# Patient Record
Sex: FEMALE | Race: WHITE | Employment: OTHER | URBAN - METROPOLITAN AREA
[De-identification: names, ages, dates, MRNs, and addresses within clinical notes are randomized per-mention and may not be internally consistent; named-entity substitution may affect disease eponyms.]

---

## 2017-09-15 ENCOUNTER — HOSPITAL ENCOUNTER (OUTPATIENT)
Dept: ULTRASOUND IMAGING | Age: 77
Discharge: HOME | End: 2017-09-15
Attending: DERMATOLOGY
Payer: MEDICARE

## 2017-09-15 DIAGNOSIS — M79.89 LEG SWELLING: ICD-10-CM

## 2017-09-15 PROCEDURE — 93971 EXTREMITY STUDY: CPT

## 2018-04-04 ENCOUNTER — HOSPITAL ENCOUNTER (INPATIENT)
Age: 78
LOS: 1 days | Discharge: HOME | DRG: 603 | End: 2018-04-05
Attending: STUDENT IN AN ORGANIZED HEALTH CARE EDUCATION/TRAINING PROGRAM | Admitting: INTERNAL MEDICINE
Payer: MEDICARE

## 2018-04-04 ENCOUNTER — APPOINTMENT (OUTPATIENT)
Dept: GENERAL RADIOLOGY | Age: 78
DRG: 603 | End: 2018-04-04
Attending: STUDENT IN AN ORGANIZED HEALTH CARE EDUCATION/TRAINING PROGRAM
Payer: MEDICARE

## 2018-04-04 DIAGNOSIS — I77.6 VASCULITIS (HCC): ICD-10-CM

## 2018-04-04 DIAGNOSIS — R60.0 LOWER EXTREMITY EDEMA: Primary | ICD-10-CM

## 2018-04-04 PROBLEM — E87.1 HYPONATREMIA: Status: ACTIVE | Noted: 2018-04-04

## 2018-04-04 PROBLEM — E83.52 HYPERCALCEMIA: Status: ACTIVE | Noted: 2018-04-04

## 2018-04-04 PROBLEM — I63.9 CVA (CEREBRAL VASCULAR ACCIDENT) (HCC): Status: ACTIVE | Noted: 2018-04-04

## 2018-04-04 LAB
ALBUMIN SERPL-MCNC: 3.8 G/DL (ref 3.5–5)
ALBUMIN/GLOB SERPL: 1 {RATIO} (ref 1.1–2.2)
ALP SERPL-CCNC: 141 U/L (ref 45–117)
ALT SERPL-CCNC: 30 U/L (ref 12–78)
ANION GAP SERPL CALC-SCNC: 9 MMOL/L (ref 5–15)
APPEARANCE UR: CLEAR
AST SERPL-CCNC: 26 U/L (ref 15–37)
BACTERIA URNS QL MICRO: NEGATIVE /HPF
BASOPHILS # BLD: 0 K/UL (ref 0–0.1)
BASOPHILS NFR BLD: 0 % (ref 0–1)
BILIRUB SERPL-MCNC: 0.6 MG/DL (ref 0.2–1)
BILIRUB UR QL: NEGATIVE
BUN SERPL-MCNC: 14 MG/DL (ref 6–20)
BUN/CREAT SERPL: 15 (ref 12–20)
CALCIUM SERPL-MCNC: 10.3 MG/DL (ref 8.5–10.1)
CHLORIDE SERPL-SCNC: 96 MMOL/L (ref 97–108)
CO2 SERPL-SCNC: 29 MMOL/L (ref 21–32)
COLOR UR: ABNORMAL
CREAT SERPL-MCNC: 0.95 MG/DL (ref 0.55–1.02)
CRP SERPL-MCNC: 4.5 MG/DL
DIFFERENTIAL METHOD BLD: ABNORMAL
EOSINOPHIL # BLD: 0.1 K/UL (ref 0–0.4)
EOSINOPHIL NFR BLD: 1 % (ref 0–7)
EPITH CASTS URNS QL MICRO: NORMAL /LPF
ERYTHROCYTE [DISTWIDTH] IN BLOOD BY AUTOMATED COUNT: 12.8 % (ref 11.5–14.5)
ERYTHROCYTE [SEDIMENTATION RATE] IN BLOOD: 35 MM/HR (ref 0–30)
GLOBULIN SER CALC-MCNC: 3.9 G/DL (ref 2–4)
GLUCOSE SERPL-MCNC: 134 MG/DL (ref 65–100)
GLUCOSE UR STRIP.AUTO-MCNC: NEGATIVE MG/DL
HCT VFR BLD AUTO: 40.4 % (ref 35–47)
HGB BLD-MCNC: 14 G/DL (ref 11.5–16)
HGB UR QL STRIP: NEGATIVE
KETONES UR QL STRIP.AUTO: NEGATIVE MG/DL
LACTATE SERPL-SCNC: 1.4 MMOL/L (ref 0.4–2)
LEUKOCYTE ESTERASE UR QL STRIP.AUTO: ABNORMAL
LYMPHOCYTES # BLD: 1 K/UL (ref 0.8–3.5)
LYMPHOCYTES NFR BLD: 9 % (ref 12–49)
MCH RBC QN AUTO: 34 PG (ref 26–34)
MCHC RBC AUTO-ENTMCNC: 34.7 G/DL (ref 30–36.5)
MCV RBC AUTO: 98.1 FL (ref 80–99)
MONOCYTES # BLD: 1 K/UL (ref 0–1)
MONOCYTES NFR BLD: 10 % (ref 5–13)
NEUTS SEG # BLD: 8.5 K/UL (ref 1.8–8)
NEUTS SEG NFR BLD: 80 % (ref 32–75)
NITRITE UR QL STRIP.AUTO: NEGATIVE
PH UR STRIP: 7 [PH] (ref 5–8)
PLATELET # BLD AUTO: 260 K/UL (ref 150–400)
PMV BLD AUTO: 8.9 FL (ref 8.9–12.9)
POTASSIUM SERPL-SCNC: 4.4 MMOL/L (ref 3.5–5.1)
PROT SERPL-MCNC: 7.7 G/DL (ref 6.4–8.2)
PROT UR STRIP-MCNC: NEGATIVE MG/DL
RBC # BLD AUTO: 4.12 M/UL (ref 3.8–5.2)
RBC #/AREA URNS HPF: NORMAL /HPF (ref 0–5)
SODIUM SERPL-SCNC: 134 MMOL/L (ref 136–145)
SP GR UR REFRACTOMETRY: 1.01 (ref 1–1.03)
UA: UC IF INDICATED,UAUC: NORMAL
UROBILINOGEN UR QL STRIP.AUTO: 0.2 EU/DL (ref 0.2–1)
WBC # BLD AUTO: 10.6 K/UL (ref 3.6–11)
WBC URNS QL MICRO: NORMAL /HPF (ref 0–4)
XXWBCSUS: 0

## 2018-04-04 PROCEDURE — 99218 HC RM OBSERVATION: CPT

## 2018-04-04 PROCEDURE — 71045 X-RAY EXAM CHEST 1 VIEW: CPT

## 2018-04-04 PROCEDURE — 86140 C-REACTIVE PROTEIN: CPT | Performed by: STUDENT IN AN ORGANIZED HEALTH CARE EDUCATION/TRAINING PROGRAM

## 2018-04-04 PROCEDURE — 85025 COMPLETE CBC W/AUTO DIFF WBC: CPT | Performed by: STUDENT IN AN ORGANIZED HEALTH CARE EDUCATION/TRAINING PROGRAM

## 2018-04-04 PROCEDURE — 36415 COLL VENOUS BLD VENIPUNCTURE: CPT | Performed by: STUDENT IN AN ORGANIZED HEALTH CARE EDUCATION/TRAINING PROGRAM

## 2018-04-04 PROCEDURE — 80053 COMPREHEN METABOLIC PANEL: CPT | Performed by: STUDENT IN AN ORGANIZED HEALTH CARE EDUCATION/TRAINING PROGRAM

## 2018-04-04 PROCEDURE — 85652 RBC SED RATE AUTOMATED: CPT | Performed by: STUDENT IN AN ORGANIZED HEALTH CARE EDUCATION/TRAINING PROGRAM

## 2018-04-04 PROCEDURE — 93005 ELECTROCARDIOGRAM TRACING: CPT

## 2018-04-04 PROCEDURE — 99285 EMERGENCY DEPT VISIT HI MDM: CPT

## 2018-04-04 PROCEDURE — 81001 URINALYSIS AUTO W/SCOPE: CPT | Performed by: STUDENT IN AN ORGANIZED HEALTH CARE EDUCATION/TRAINING PROGRAM

## 2018-04-04 PROCEDURE — 83605 ASSAY OF LACTIC ACID: CPT | Performed by: STUDENT IN AN ORGANIZED HEALTH CARE EDUCATION/TRAINING PROGRAM

## 2018-04-04 PROCEDURE — 87040 BLOOD CULTURE FOR BACTERIA: CPT | Performed by: STUDENT IN AN ORGANIZED HEALTH CARE EDUCATION/TRAINING PROGRAM

## 2018-04-04 RX ORDER — SODIUM CHLORIDE 9 MG/ML
75 INJECTION, SOLUTION INTRAVENOUS CONTINUOUS
Status: DISCONTINUED | OUTPATIENT
Start: 2018-04-05 | End: 2018-04-05

## 2018-04-04 RX ORDER — SODIUM CHLORIDE 0.9 % (FLUSH) 0.9 %
5-10 SYRINGE (ML) INJECTION EVERY 8 HOURS
Status: DISCONTINUED | OUTPATIENT
Start: 2018-04-05 | End: 2018-04-05 | Stop reason: HOSPADM

## 2018-04-04 RX ORDER — ENOXAPARIN SODIUM 100 MG/ML
40 INJECTION SUBCUTANEOUS EVERY 24 HOURS
Status: DISCONTINUED | OUTPATIENT
Start: 2018-04-05 | End: 2018-04-05 | Stop reason: HOSPADM

## 2018-04-04 RX ORDER — SODIUM CHLORIDE 9 MG/ML
5-10 INJECTION INTRAMUSCULAR; INTRAVENOUS; SUBCUTANEOUS AS NEEDED
Status: DISCONTINUED | OUTPATIENT
Start: 2018-04-04 | End: 2018-04-05 | Stop reason: HOSPADM

## 2018-04-04 RX ORDER — SODIUM CHLORIDE 0.9 % (FLUSH) 0.9 %
5-10 SYRINGE (ML) INJECTION AS NEEDED
Status: DISCONTINUED | OUTPATIENT
Start: 2018-04-04 | End: 2018-04-05 | Stop reason: HOSPADM

## 2018-04-04 NOTE — ED TRIAGE NOTES
Triage note:  Pt drove self with c/o redness/ swelling to mikhail lower legs. Pt stated this started 2 days ago.

## 2018-04-04 NOTE — IP AVS SNAPSHOT
Summary of Care Report The Summary of Care report has been created to help improve care coordination. Users with access to [employer] or [address] (Web-based application) may access additional patient information including the Discharge Summary. If you are not currently a [address] user and need more information, please call the number listed below in the [place] [address] section and ask to be connected with Medical Records. Facility Information Name Address Phone [address] [facility] Jose Carlos Becerra [address] [phone] Patient Information Patient Name Sex CRISTIAN Ayala ([id number]) Female 1940 Discharge Information Admitting Provider Service Area [facility] MD Erika / [address] [place] [facility]  [phone] Discharge Provider Discharge Date/Time Discharge Disposition Destination (none) 2018 (Pending) AHR (none) Patient Language Language ENGLISH [13] Hospital Problems as of 2018  Reviewed: 2018 11:48 PM by Jabari Shin MD  
  
  
  
 Class Noted - Resolved Last Modified POA Active Problems HTN (hypertension) (Chronic)  2012 - Present 2018 by Jabari Shin MD Yes Entered by Anabel Lizama MD  
  Overview Signed 2014  9:27 AM by Gregory Stevenson MD  
   A. Echo (12):  EF 65%, pseudo. Mildly dil LA. Mild MR/AR. PASP 40. Hyperlipidemia (Chronic)  2012 - Present 2018 by Jabari Shin MD Yes Entered by Anabel Lizama MD  
  CVA (cerebral vascular accident) [facility])  2018 - Present 2018 by Jabari Shin MD Unknown Entered by Jabari Shin MD  
  Hyponatremia  2018 - Present 2018 by Jabari Shin MD Unknown   Entered by Jabari Shin MD  
 Hypercalcemia  4/4/2018 - Present 4/4/2018 by Gilford Saunders, MD Unknown Entered by Gilford Saunders, MD  
  Cellulitis  4/5/2018 - Present 4/5/2018 by Gilford Saunders, MD Unknown Entered by Gilford Saunders, MD  
  Psoriasis  4/5/2018 - Present 4/5/2018 by Gilford Saunders, MD Unknown Entered by Gilford Saunders, MD  
  Leg swelling  4/5/2018 - Present 4/5/2018 by Gilford Saunders, MD Unknown Entered by Gilford Saunders, MD  
  PVD (peripheral vascular disease) ([facility]  4/5/2018 - Present 4/5/2018 by Gilford Saunders, MD Unknown Entered by Gilford Saunders, MD  
  Bilateral leg edema  4/5/2018 - Present 4/5/2018 by Gilford Saunders, MD Unknown Entered by Gilford Saunders, MD  
  Vasculitis ([facility]  4/5/2018 - Present 4/5/2018 by Kahlil Sherwood MD Unknown Entered by Kahlil Sherwood MD  
  
Non-Hospital Problems as of 4/5/2018  Reviewed: 4/4/2018 11:48 PM by Gilford Saunders, MD  
  
  
  
 Class Noted - Resolved Last Modified Active Problems CVA (cerebral infarction)  5/29/2012 - Present 5/31/2012 Entered by Alvaro North MD  
  
You are allergic to the following Allergen Reactions Codeine Other (comments) Hallucinations and dry heaves. Current Discharge Medication List  
  
START taking these medications Dose & Instructions Dispensing Information Comments  
 amoxicillin-clavulanate 875-125 mg per tablet Commonly known as:  AUGMENTIN Dose:  1 Tab Take 1 Tab by mouth every twelve (12) hours for 7 days. Quantity:  14 Tab Refills:  0  
   
 doxycycline 100 mg tablet Commonly known as:  ADOXA Dose:  100 mg Take 1 Tab by mouth two (2) times a day for 7 days. Quantity:  14 Tab Refills:  0  
   
 l.acidoph-B.lactis-B.longum 460 mg (7.5-6- 1.5 bill. cell) Cap cap Commonly known as:  [id number] Dose:  1 Cap Take 1 Cap by mouth Daily (before breakfast). Quantity:  7 Cap Refills:  0 CONTINUE these medications which have NOT CHANGED Dose & Instructions Dispensing Information Comments  
 aspirin 81 mg chewable tablet Dose:  81 mg Take 81 mg by mouth daily. Refills:  0  
   
 atorvastatin 10 mg tablet Commonly known as:  LIPITOR Dose:  10 mg Take 1 Tab by mouth daily. Quantity:  30 Tab Refills:  0  
   
 calcium-vitamin D 500 mg(1,250mg) -200 unit per tablet Commonly known as:  OYSTER SHELL Dose:  1 Tab Take 1 Tab by mouth two (2) times daily (with meals). Refills:  0  
   
 cloNIDine HCl 0.1 mg tablet Commonly known as:  CATAPRES Dose:  0.1 mg Take 0.1 mg by mouth two (2) times a day. Refills:  0  
   
 DIOVAN 80 mg tablet Generic drug:  valsartan Dose:  80 mg Take 80 mg by mouth daily. Refills:  0  
   
 FISH OIL 1,000 mg Cap Generic drug:  omega-3 fatty acids-vitamin e Dose:  1 Cap Take 1 Cap by mouth. Refills:  0  
   
 ustekinumab 45 mg/0.5 mL Soln Generic drug:  Ustekinumab Dose:  45 mg  
45 mg by SubCUTAneous route. Refills:  0  
   
 VITAMIN D3 1,000 unit Cap Generic drug:  cholecalciferol Take  by mouth. Refills:  0 Current Immunizations Name Date Tdap 2014 Follow-up Information Follow up With Details Comments Contact Info Desirae Alston MD In 1 week  [place] [address] 
[phone] 
  
 follow up with your dermatologist     
 Shea Kate MD  As needed [address] [place] [address] RESIDENTIAL [facility]MENT FACILITY [address] [place] [address] 
[phone] Discharge Instructions HOSPITALIST DISCHARGE INSTRUCTIONS 
NAME: Margot Blanco :  1940 MRN:  [id number] Date/Time:  2018 12:59 PM 
 
ADMIT DATE: 2018 DISCHARGE DATE: 2018 PRINCIPAL DISCHARGE DIAGNOSES: 
Leg swelling MEDICATIONS: 
· It is important that you take the medication exactly as they are prescribed. Note the changes and additions to your medications.  Be sure you understand these changes before you are discharged today. · Keep your medication in the bottles provided by the pharmacist and keep a list of the medication names, dosages, and times to be taken in your wallet. · Do not take other medications without consulting your doctor. Pain Management: per above medications What to do at [facility] Recommended diet:  Cardiac Diet Recommended activity: Activity as tolerated If you experience any of the following symptoms then please call your primary care physician or return to the emergency room if you cannot get hold of your doctor: 
Fever, chills, worsening leg swelling/redness/pain, or other severe concerning symptoms that brought you to the hospital in the first place Follow Up: Follow-up Information Follow up With Details Comments Contact Info James Britton MD In 1 week  [place] [address] [phone] 
  
 follow up with your dermatologist     
 Adriano Mahan MD  As needed [address] [place] [address] TREATMENT FACILITY [address] [place] [address] 
[phone] Information obtained by : 
I understand that if any problems occur once I am at home I am to contact my physician. I understand and acknowledge receipt of the instructions indicated above. Physician's or R.N.'s Signature                                                                  Date/Time Patient or Representative Signature                                                          Date/Time Chart Review Routing History Recipient Method Report Sent By Jaspreet Britton MD  
Fax: [phone] Phone: [phone] Fax Gordan Gosselin MD NOTES AUTO ROUTING REPORT Alfonso Diallo MD [[id number]] 4/5/2018 12:56 AM 04/05/2018

## 2018-04-04 NOTE — IP AVS SNAPSHOT
[address] [facility] [address] 
 
 
 [address] [facility] [address] [facility] 
[phone] Patient: Chrissy Steel MRN: [id number] LCT:1/68/8875 About your hospitalization You were admitted on:  April 4, 2018 You last received care in the:  [facility] 5M1 MED SURG 1 You were discharged on:  April 5, 2018 Why you were hospitalized Your primary diagnosis was:  Not on File Your diagnoses also included:  Hyperlipidemia, Htn (Hypertension), Cva (Cerebral Vascular Accident) (Hcc), Hyponatremia, Hypercalcemia, Cellulitis, Psoriasis, Leg Swelling, Pvd (Peripheral Vascular Disease) (Hcc), Bilateral Leg Edema, Vasculitis (H[facility]) Follow-up Information Follow up With Details Comments Contact Info Raymon Fitzpatrick MD In 1 week  [place] [address] [phone] 
  
 follow up with your dermatologist     
 Sigifredo Haney MD  As needed [address] [facility] RESIDENTIAL TREATMENT FACILITY [address] [facility] 
[phone] Discharge Orders None A check codi indicates which time of day the medication should be taken. My Medications START taking these medications Instructions Each Dose to Equal  
 Morning Noon Evening Bedtime  
 amoxicillin-clavulanate 875-125 mg per tablet Commonly known as:  AUGMENTIN Take 1 Tab by mouth every twelve (12) hours for 7 days. 1 Tab  
    
   
   
   
  
 doxycycline 100 mg tablet Commonly known as:  ADOXA Take 1 Tab by mouth two (2) times a day for 7 days. 100 mg  
    
   
   
   
  
 l.acidoph-B.lactis-B.longum 460 mg (7.5-6- 1.5 bill. cell) Cap cap Commonly known as:  PULPBGOX4 Take 1 Cap by mouth Daily (before breakfast). 1 Cap CONTINUE taking these medications Instructions Each Dose to Equal  
 Morning Noon Evening Bedtime  
 aspirin 81 mg chewable tablet Your last dose was:  81 mg on 4/5/2018  9:19 AM  
   
 Take 81 mg by mouth daily. 81 mg  
    
   
   
   
  
 atorvastatin 10 mg tablet Commonly known as:  LIPITOR Your last dose was:  10 mg on 2018  9:19 AM  
   
 Take 1 Tab by mouth daily. 10 mg  
    
   
   
   
  
 calcium-vitamin D 500 mg(1,250mg) -200 unit per tablet Commonly known as:  OYSTER SHELL Take 1 Tab by mouth two (2) times daily (with meals). 1 Tab  
    
   
   
   
  
 cloNIDine HCl 0.1 mg tablet Commonly known as:  CATAPRES Take 0.1 mg by mouth two (2) times a day. 0.1 mg  
    
   
   
   
  
 DIOVAN 80 mg tablet Generic drug:  valsartan Take 80 mg by mouth daily. 80 mg FISH OIL 1,000 mg Cap Generic drug:  omega-3 fatty acids-vitamin e Take 1 Cap by mouth. 1 Cap  
    
   
   
   
  
 ustekinumab 45 mg/0.5 mL Soln Generic drug:  Ustekinumab 45 mg by SubCUTAneous route. 45 mg  
    
   
   
   
  
 VITAMIN D3 1,000 unit Cap Generic drug:  cholecalciferol Take  by mouth. Where to Get Your Medications Information on where to get these meds will be given to you by the nurse or doctor. ! Ask your nurse or doctor about these medications  
  amoxicillin-clavulanate 875-125 mg per tablet  
 doxycycline 100 mg tablet  
 l.acidoph-B.lactis-B.longum 460 mg (7.5-6- 1.5 bill. cell) Cap cap Discharge Instructions HOSPITALIST DISCHARGE INSTRUCTIONS 
NAME: Adair Gay :  1940 MRN:  [id number] Date/Time:  2018 12:59 PM 
 
ADMIT DATE: 2018 DISCHARGE DATE: 2018 PRINCIPAL DISCHARGE DIAGNOSES: 
Leg swelling MEDICATIONS: 
· It is important that you take the medication exactly as they are prescribed. Note the changes and additions to your medications. Be sure you understand these changes before you are discharged today.  
· Keep your medication in the bottles provided by the pharmacist and keep a list of the medication names, dosages, and times to be taken in your wallet. · Do not take other medications without consulting your doctor. Pain Management: per above medications What to do at [facility] Recommended diet:  Cardiac Diet Recommended activity: Activity as tolerated If you experience any of the following symptoms then please call your primary care physician or return to the emergency room if you cannot get hold of your doctor: 
Fever, chills, worsening leg swelling/redness/pain, or other severe concerning symptoms that brought you to the hospital in the first place Follow Up: Follow-up Information Follow up With Details Comments Contact Info Ricki Chase MD In 1 week  [place] [id number] [phone] 
  
 follow up with your dermatologist     
 Meli Pandey MD  As needed [address] [place] [address] TREATMENT FACILITY [address] [place] [address] 
[phone] Information obtained by : 
I understand that if any problems occur once I am at home I am to contact my physician. I understand and acknowledge receipt of the instructions indicated above. Physician's or R.N.'s Signature                                                                  Date/Time Patient or Representative Signature                                                          Date/Time [place]inet Announcement We are excited to announce that we are making your provider's discharge notes available to you in [place]. You will see these notes when they are completed and signed by the physician that discharged you from your recent hospital stay.   If you have any questions or concerns about any information you see in [employer], please call the Health Information Department where you were seen or reach out to your Primary Care Provider for more information about your plan of care. Introducing [facility]! Allen Lugo introduces [employer] patient portal. Now you can access parts of your medical record, email your doctor's office, and request medication refills online. 1. In your internet browser, go to [email or web address] 2. Click on the First Time User? Click Here link in the Sign In box. You will see the New Member Sign Up page. 3. Enter your [employer] Access Code exactly as it appears below. You will not need to use this code after youve completed the sign-up process. If you do not sign up before the expiration date, you must request a new code. · [employer] Access Code: [email or web address] Expires: 7/3/2018  7:37 PM 
 
4. Enter the last four digits of your Social Security Number (xxxx) and Date of Birth (mm/dd/yyyy) as indicated and click Submit. You will be taken to the next sign-up page. 5. Create a [employer] ID. This will be your [employer] login ID and cannot be changed, so think of one that is secure and easy to remember. 6. Create a [employer] password. You can change your password at any time. 7. Enter your Password Reset Question and Answer. This can be used at a later time if you forget your password. 8. Enter your e-mail address. You will receive e-mail notification when new information is available in [address] 9. Click Sign Up. You can now view and download portions of your medical record. 10. Click the Download Summary menu link to download a portable copy of your medical information. If you have questions, please visit the Frequently Asked Questions section of the [employer] website. Remember, [employer] is NOT to be used for urgent needs. For medical emergencies, dial 911. Now available from your iPhone and Android! Introducing Mukesh Burns As a [facility] patient, I wanted to make you aware of our electronic visit tool called Mukesh [employer]. [employer] 24/7 allows you to connect within minutes with a medical provider 24 hours a day, seven days a week via a mobile device or tablet or logging into a secure website from your computer. You can access Mukesh Stokesfin from anywhere in the [place]. A virtual visit might be right for you when you have a simple condition and feel like you just dont want to get out of bed, or cant get away from work for an appointment, when your regular [facility] provider is not available (evenings, weekends or holidays), or when youre out of town and need minor care. Electronic visits cost only $49 and if the [place] [employer] 24/7 provider determines a prescription is needed to treat your condition, one can be electronically transmitted to a nearby pharmacy*. Please take a moment to enroll today if you have not already done so. The enrollment process is free and takes just a few minutes. To enroll, please download the Fiona Guzman 24/7 krysta to your tablet or phone, or visit [email or web address] to enroll on your computer. And, as an [address] [place] [address] patient with a [employer] account, the results of your visits will be scanned into your electronic medical record and your primary care provider will be able to view the scanned results. We urge you to continue to see your regular [facility] provider for your ongoing medical care. And while your primary care provider may not be the one available when you seek a Mukesh Burrismeloniefin virtual visit, the peace of mind you get from getting a real diagnosis real time can be priceless. For more information on Mukesh Daytonmeloniefin, view our Frequently Asked Questions (FAQs) at [email or web address]. Sincerely, 
 
Libby Diaz MD 
Chief Medical Officer Jack8 Jennifer Ellington *:  certain medications cannot be prescribed via Mukesh Burns Unresulted Labs-Please follow up with your PCP about these lab tests Order Current Status CULTURE, BLOOD In process CULTURE, MRSA In process ANKLE BRACHIAL INDEX Preliminary result Providers Seen During Your Hospitalization Provider Specialty Primary office phone Malou Christie MD Emergency Medicine [phone] Greg Logan MD Hospitalist [phone] Mauro Marie MD Internal Medicine [phone] Your Primary Care Physician (PCP) Primary Care Physician Office Phone Office Fax Sachin Celeste [phone] You are allergic to the following Allergen Reactions Codeine Other (comments) Hallucinations and dry heaves. Recent Documentation Height Weight BMI OB Status Smoking Status 1.626 m 61.3 kg 23.2 kg/m2 Postmenopausal Never Smoker Emergency Contacts Name Discharge Info Relation Home Work Mobile [address] CAREGIVER [3] Spouse [3] [phone] Patient Belongings The following personal items are in your possession at time of discharge: 
     Visual Aid: Glasses, At bedside          Jewelry: Ring, With patient, Bracelet  Clothing: Pants, Shirt, Undergarments, With patient Please provide this summary of care documentation to your next provider. Signatures-by signing, you are acknowledging that this After Visit Summary has been reviewed with you and you have received a copy. Patient Signature:  ____________________________________________________________ Date:  ____________________________________________________________  
  
Gaby Hoskins Provider Signature:  ____________________________________________________________ Date:  ____________________________________________________________

## 2018-04-05 VITALS
SYSTOLIC BLOOD PRESSURE: 165 MMHG | BODY MASS INDEX: 23.07 KG/M2 | WEIGHT: 135.14 LBS | HEART RATE: 73 BPM | OXYGEN SATURATION: 99 % | DIASTOLIC BLOOD PRESSURE: 81 MMHG | HEIGHT: 64 IN | TEMPERATURE: 97.8 F | RESPIRATION RATE: 18 BRPM

## 2018-04-05 PROBLEM — L40.9 PSORIASIS: Status: ACTIVE | Noted: 2018-04-05

## 2018-04-05 PROBLEM — M79.89 LEG SWELLING: Status: ACTIVE | Noted: 2018-04-05

## 2018-04-05 PROBLEM — I77.6 VASCULITIS (HCC): Status: RESOLVED | Noted: 2018-04-04 | Resolved: 2018-04-05

## 2018-04-05 PROBLEM — L03.90 CELLULITIS: Status: ACTIVE | Noted: 2018-04-05

## 2018-04-05 PROBLEM — I73.9 PVD (PERIPHERAL VASCULAR DISEASE) (HCC): Status: ACTIVE | Noted: 2018-04-05

## 2018-04-05 PROBLEM — I77.6 VASCULITIS (HCC): Status: ACTIVE | Noted: 2018-04-05

## 2018-04-05 PROBLEM — R60.0 BILATERAL LEG EDEMA: Status: ACTIVE | Noted: 2018-04-05

## 2018-04-05 LAB
ANION GAP SERPL CALC-SCNC: 7 MMOL/L (ref 5–15)
BUN SERPL-MCNC: 13 MG/DL (ref 6–20)
BUN/CREAT SERPL: 15 (ref 12–20)
CALCIUM SERPL-MCNC: 9.4 MG/DL (ref 8.5–10.1)
CHLORIDE SERPL-SCNC: 99 MMOL/L (ref 97–108)
CO2 SERPL-SCNC: 26 MMOL/L (ref 21–32)
CREAT SERPL-MCNC: 0.88 MG/DL (ref 0.55–1.02)
ERYTHROCYTE [DISTWIDTH] IN BLOOD BY AUTOMATED COUNT: 12.7 % (ref 11.5–14.5)
GLUCOSE SERPL-MCNC: 141 MG/DL (ref 65–100)
HCT VFR BLD AUTO: 36 % (ref 35–47)
HGB BLD-MCNC: 12.3 G/DL (ref 11.5–16)
LACTATE SERPL-SCNC: 2 MMOL/L (ref 0.4–2)
MCH RBC QN AUTO: 33.4 PG (ref 26–34)
MCHC RBC AUTO-ENTMCNC: 34.2 G/DL (ref 30–36.5)
MCV RBC AUTO: 97.8 FL (ref 80–99)
NRBC # BLD: 0 K/UL (ref 0–0.01)
NRBC BLD-RTO: 0 PER 100 WBC
PLATELET # BLD AUTO: 228 K/UL (ref 150–400)
PMV BLD AUTO: 8.6 FL (ref 8.9–12.9)
POTASSIUM SERPL-SCNC: 4 MMOL/L (ref 3.5–5.1)
RBC # BLD AUTO: 3.68 M/UL (ref 3.8–5.2)
SODIUM SERPL-SCNC: 132 MMOL/L (ref 136–145)
WBC # BLD AUTO: 7.2 K/UL (ref 3.6–11)

## 2018-04-05 PROCEDURE — 74011250637 HC RX REV CODE- 250/637: Performed by: INTERNAL MEDICINE

## 2018-04-05 PROCEDURE — 83605 ASSAY OF LACTIC ACID: CPT | Performed by: STUDENT IN AN ORGANIZED HEALTH CARE EDUCATION/TRAINING PROGRAM

## 2018-04-05 PROCEDURE — 93970 EXTREMITY STUDY: CPT

## 2018-04-05 PROCEDURE — 99218 HC RM OBSERVATION: CPT

## 2018-04-05 PROCEDURE — 74011000258 HC RX REV CODE- 258: Performed by: INTERNAL MEDICINE

## 2018-04-05 PROCEDURE — 93306 TTE W/DOPPLER COMPLETE: CPT

## 2018-04-05 PROCEDURE — 93922 UPR/L XTREMITY ART 2 LEVELS: CPT

## 2018-04-05 PROCEDURE — 65270000029 HC RM PRIVATE

## 2018-04-05 PROCEDURE — 85027 COMPLETE CBC AUTOMATED: CPT | Performed by: INTERNAL MEDICINE

## 2018-04-05 PROCEDURE — 36415 COLL VENOUS BLD VENIPUNCTURE: CPT | Performed by: INTERNAL MEDICINE

## 2018-04-05 PROCEDURE — 80048 BASIC METABOLIC PNL TOTAL CA: CPT | Performed by: INTERNAL MEDICINE

## 2018-04-05 PROCEDURE — 74011250636 HC RX REV CODE- 250/636: Performed by: INTERNAL MEDICINE

## 2018-04-05 RX ORDER — DOXYCYCLINE 100 MG/1
100 TABLET ORAL 2 TIMES DAILY
Qty: 14 TAB | Refills: 0 | Status: SHIPPED | OUTPATIENT
Start: 2018-04-05 | End: 2018-04-12

## 2018-04-05 RX ORDER — ACETAMINOPHEN 325 MG/1
650 TABLET ORAL
Status: DISCONTINUED | OUTPATIENT
Start: 2018-04-05 | End: 2018-04-05 | Stop reason: HOSPADM

## 2018-04-05 RX ORDER — DOXYCYCLINE 100 MG/1
100 TABLET ORAL 2 TIMES DAILY
Qty: 14 TAB | Refills: 0 | Status: SHIPPED | OUTPATIENT
Start: 2018-04-05 | End: 2018-04-05

## 2018-04-05 RX ORDER — GUAIFENESIN 100 MG/5ML
81 LIQUID (ML) ORAL DAILY
Status: DISCONTINUED | OUTPATIENT
Start: 2018-04-05 | End: 2018-04-05 | Stop reason: HOSPADM

## 2018-04-05 RX ORDER — AMOXICILLIN AND CLAVULANATE POTASSIUM 875; 125 MG/1; MG/1
1 TABLET, FILM COATED ORAL EVERY 12 HOURS
Qty: 14 TAB | Refills: 0 | Status: SHIPPED | OUTPATIENT
Start: 2018-04-05 | End: 2018-04-05

## 2018-04-05 RX ORDER — HYDRALAZINE HYDROCHLORIDE 20 MG/ML
10 INJECTION INTRAMUSCULAR; INTRAVENOUS
Status: DISCONTINUED | OUTPATIENT
Start: 2018-04-05 | End: 2018-04-05 | Stop reason: HOSPADM

## 2018-04-05 RX ORDER — VALSARTAN 80 MG/1
80 TABLET ORAL DAILY
Status: DISCONTINUED | OUTPATIENT
Start: 2018-04-05 | End: 2018-04-05 | Stop reason: HOSPADM

## 2018-04-05 RX ORDER — CLONIDINE HYDROCHLORIDE 0.1 MG/1
0.1 TABLET ORAL 2 TIMES DAILY
Status: DISCONTINUED | OUTPATIENT
Start: 2018-04-05 | End: 2018-04-05 | Stop reason: HOSPADM

## 2018-04-05 RX ORDER — AMOXICILLIN AND CLAVULANATE POTASSIUM 875; 125 MG/1; MG/1
1 TABLET, FILM COATED ORAL EVERY 12 HOURS
Qty: 14 TAB | Refills: 0 | Status: SHIPPED | OUTPATIENT
Start: 2018-04-05 | End: 2018-04-12

## 2018-04-05 RX ORDER — ATORVASTATIN CALCIUM 10 MG/1
10 TABLET, FILM COATED ORAL DAILY
Status: DISCONTINUED | OUTPATIENT
Start: 2018-04-05 | End: 2018-04-05 | Stop reason: HOSPADM

## 2018-04-05 RX ADMIN — Medication 10 ML: at 00:51

## 2018-04-05 RX ADMIN — ACETAMINOPHEN 650 MG: 325 TABLET ORAL at 07:03

## 2018-04-05 RX ADMIN — SODIUM CHLORIDE 75 ML/HR: 900 INJECTION, SOLUTION INTRAVENOUS at 00:12

## 2018-04-05 RX ADMIN — Medication 10 ML: at 14:40

## 2018-04-05 RX ADMIN — CLONIDINE HYDROCHLORIDE 0.1 MG: 0.1 TABLET ORAL at 06:43

## 2018-04-05 RX ADMIN — ACETAMINOPHEN 650 MG: 325 TABLET ORAL at 14:39

## 2018-04-05 RX ADMIN — ATORVASTATIN CALCIUM 10 MG: 10 TABLET, FILM COATED ORAL at 09:19

## 2018-04-05 RX ADMIN — ASPIRIN 81 MG 81 MG: 81 TABLET ORAL at 09:19

## 2018-04-05 RX ADMIN — Medication 10 ML: at 06:44

## 2018-04-05 RX ADMIN — CEFAZOLIN SODIUM 1 G: 1 INJECTION, POWDER, FOR SOLUTION INTRAMUSCULAR; INTRAVENOUS at 00:51

## 2018-04-05 RX ADMIN — CEFAZOLIN SODIUM 1 G: 1 INJECTION, POWDER, FOR SOLUTION INTRAMUSCULAR; INTRAVENOUS at 09:19

## 2018-04-05 NOTE — ED NOTES
AIDET communication provided and informed of purposeful rounding to include collaboration of entire care team; patient acknowledged understanding. Pillow and blanket given for comfort.  at bedside.

## 2018-04-05 NOTE — PROGRESS NOTES
6326  Primary Nurse Gertrude Weeks, SOURAV and Darryl Ruiz RN performed a dual skin assessment on this patient Impairment noted: scattered psoriasis, BLE red and juan, ecchymosis noted to L upper arm   Ady score is 22      2345  TRANSFER - IN REPORT:    Verbal report received from Vandana Beckwith RN (name) on [facility]  being received from  [facility] ED(unit) for routine progression of care      Report consisted of patients Situation, Background, Assessment and   Recommendations(SBAR). Information from the following report(s) SBAR, Kardex, Intake/Output, MAR and Recent Results was reviewed with the receiving nurse. Opportunity for questions and clarification was provided. Assessment completed upon patients arrival to unit and care assumed.

## 2018-04-05 NOTE — PHYSICIAN ADVISORY
Short Stay Review       Pt Name:  Juma Martin   MR#  [id number]   CSN#   [id number]   [address] [place] [address]  577/50  @ [address] [address] [facility]   Hospitalization date  4/4/2018  7:34 PM  No discharge date for patient encounter. Current Attending Physician  Snow Stein MD     A discharge order has been placed for this episode of hospital care for Ms. Juma Martin; since this hospital stay is less than two midnights, I reviewed Ms. Michelle Cervantes chart. Ms. Michelle Cervantes healthcare insurance/benefit include:  Payor: Laura Becerril / Plan: [facility] / Product Type: Medicare /     Utilization Review related clinical summary:   Please refer to my earlier note.        On the basis of chart review, this patient's hospitalization status      is appropriate for Stefania Dominguez MD MPH FACP   Physician Advisor    [address]   Utilization Review, Care Management         CSN:  [id number]   GUNJAN:   [id number]  Admitted on :  4/4/2018   Discharge order

## 2018-04-05 NOTE — DISCHARGE SUMMARY
Physician Discharge Summary     Patient ID:  Dina Loja  [id number]  69 y.o.  1940    Admit date: 4/4/2018    Discharge date: 4/5/2018    Admission Diagnoses: Vasculitis ([facility]  Vasculitis ([facility] [facility]    Principal Discharge Diagnoses:    cellulitis    OTHER PROBLEMS ADDRESSEDS  Principal Diagnosis <principal problem not specified>                                            Active Problems:    HTN (hypertension) (5/29/2012)      Overview: A.  Echo (5/30/12):  EF 65%, pseudo. Mildly dil LA. Mild MR/AR. PASP       40. Hyperlipidemia (5/29/2012)      CVA (cerebral vascular accident) ([facility] (4/4/2018)      Hyponatremia (4/4/2018)      Hypercalcemia (4/4/2018)      Cellulitis (4/5/2018)      Psoriasis (4/5/2018)      Leg swelling (4/5/2018)      PVD (peripheral vascular disease) ([facility] (4/5/2018)      Bilateral leg edema (4/5/2018)      Vasculitis ([facility] [facility] (4/5/2018)       Patient Active Problem List   Diagnosis Code    CVA (cerebral infarction) I63.9    HTN (hypertension) I10    Hyperlipidemia E78.5    CVA (cerebral vascular accident) ([facility] I63.9    Hyponatremia E87.1    Hypercalcemia E83.52    Cellulitis L03.90    Psoriasis L40.9    Leg swelling M79.89    PVD (peripheral vascular disease) ([facility]) I73.9    Bilateral leg edema R60.0    Vasculitis ([facility]) I77.6         Hospital Course:          Bilateral leg edema (4/5/2018)/ hyperemia: recently started on Stelara. With redness, swelling in the setting of extensive psoriasis. May have underlying cellulitis. Non-toxic appearing. 0/4 SIRS. Given IV abx in hospital, will continue antibiotics PO outpatient (doxy and Augmentin). LE dopplers negative. MONSTER results as above, borderline PAD in the legs, and mild to moderate disease in the digits. Instructed to follow up with vascular surgery. TTE showed preserved EF, no valvular disease      Hyponatremia (4/4/2018), mild, chronic. Follow outpatient      HTN (hypertension) (5/29/2012).  BP fluctuates, continue home clonidine and valsartan        Hyperlipidemia (5/29/2012). On statin       CVA (cerebral vascular accident) ([facility] [address] [facility] (4/4/2018). Continue ASA and statin       Hypercalcemia (4/4/2018), mild resolved       Psoriasis (4/5/2018), severe. Follow up with dermatologist       Pt discharged in improved and stable condition. Procedures performed: see above  MONSTER  1. Borderline arterial disease indicated at rest in the right leg. 2.Borderline arterial disease indicated at rest in the left leg. 3. The right ankle/brachial index is 0.93 and the left ankle/brachial  index is 0.80. 4. The right toe/brachial index is 0.52 and the left toe/brachial  index is 0.51,both indicative of mild to moderate vascular disease in  the digits. Imaging studies: see above        PCP: Eliana Barba MD    Consults: None    Discharge Exam:  Patient Vitals for the past 12 hrs:   Temp Pulse Resp BP SpO2   04/05/18 1226 97.8 °F (36.6 °C) 73 18 165/81 99 %   04/05/18 0927 97.8 °F (36.6 °C) 79 18 117/67 97 %     GEN: pleasant, NAD  CV: RRR  RESP: CTAB  SKIN: 2+BLE edema, erythematous plaques, no open wounds    Disposition: home    Patient Instructions:   Current Discharge Medication List      START taking these medications    Details   doxycycline (ADOXA) 100 mg tablet Take 1 Tab by mouth two (2) times a day for 7 days. Qty: 14 Tab, Refills: 0      amoxicillin-clavulanate (AUGMENTIN) 875-125 mg per tablet Take 1 Tab by mouth every twelve (12) hours for 7 days. Qty: 14 Tab, Refills: 0      l.acidoph-B.lactis-B.longum (FLORAJEN3) 460 mg (7.5-6- 1.5 bill. cell) cap cap Take 1 Cap by mouth Daily (before breakfast). Qty: 7 Cap, Refills: 0         CONTINUE these medications which have NOT CHANGED    Details   Ustekinumab (USTEKINUMAB) 45 mg/0.5 mL soln 45 mg by SubCUTAneous route. Cholecalciferol, Vitamin D3, (VITAMIN D3) 1,000 unit cap Take  by mouth. aspirin 81 mg chewable tablet Take 81 mg by mouth daily.       atorvastatin (LIPITOR) 10 mg tablet Take 1 Tab by mouth daily. Qty: 30 Tab, Refills: 0      cloNIDine (CATAPRES) 0.1 mg tablet Take 0.1 mg by mouth two (2) times a day. valsartan (DIOVAN) 80 mg tablet Take 80 mg by mouth daily. omega-3 fatty acids-vitamin e (FISH OIL) 1,000 mg Cap Take 1 Cap by mouth.        calcium-vitamin D (OYSTER SHELL) 500 mg(1,250mg) -200 unit per tablet Take 1 Tab by mouth two (2) times daily (with meals). Activity: See discharge instructions  Diet: See discharge instructions  Wound Care: See discharge instructions    Follow-up Information     Follow up With Details Comments Jason Serrano MD In 1 week  [place] [address]  [phone]      follow up with your dermatologist       Ramana Bustos MD  As needed [facility] [address]  [phone]            I spent 35 minutes on this discharge.     Signed:  Siri Wick MD  4/5/2018  1:02 PM

## 2018-04-05 NOTE — ED PROVIDER NOTES
HPI Comments: Patient is a 71-year-old female presenting to the emergency department for bilateral lower extremity edema, swelling. Patient states that redness and swelling started approximately 2 days ago patient feels that this is all related to her psoriasis medication that she was started on a few weeks ago Stelara (ustekinumab). Patient denies any fevers, chills, body aches is complaining of lower extremity swelling tenderness to palpation. She's also been noticing some oozing from the bilateral lower extremities. Patient's been on multiple immunotherapies for her psoriasis recently started on stelara on 3/4. She denies any chest pain, shortness of breath, abdominal pain, nausea/vomiting, diarrhea. Patient is a 69 y.o. female presenting with skin infection. The history is provided by the patient. Skin Infection   This is a new problem. The current episode started 2 days ago. The problem has been gradually worsening. Pertinent negatives include no chest pain, no abdominal pain, no headaches and no shortness of breath. Past Medical History:   Diagnosis Date    H/O psoriasis     Hyperlipidemia     Hypertension     Stroke ([facility] 2012    TIA       Past Surgical History:   Procedure Laterality Date    HX CATARACT REMOVAL      HX HYSTERECTOMY      OH NASAL SURG PROC UNLISTED           Family History:   Problem Relation Age of Onset    Other Mother       from surgical complications    Hypertension Mother     Hypertension Father     Stroke Father        Social History     Social History    Marital status:      Spouse name: N/A    Number of children: N/A    Years of education: N/A     Occupational History    Not on file.      Social History Main Topics    Smoking status: Never Smoker    Smokeless tobacco: Never Used    Alcohol use 3.5 oz/week     7 Standard drinks or equivalent per week      Comment: red wine, one glass a night    Drug use: Not on file    Sexual activity: Not on file     Other Topics Concern    Not on file     Social History Narrative         ALLERGIES: Codeine    Review of Systems   Constitutional: Negative for activity change, diaphoresis, fatigue and fever. HENT: Negative for congestion and sore throat. Eyes: Negative for photophobia and visual disturbance. Respiratory: Negative for chest tightness and shortness of breath. Cardiovascular: Negative for chest pain, palpitations and leg swelling. Gastrointestinal: Negative for abdominal pain, blood in stool, constipation, diarrhea, nausea and vomiting. Genitourinary: Negative for difficulty urinating, dysuria, flank pain, frequency and hematuria. Musculoskeletal: Negative for back pain. Neurological: Negative for dizziness, syncope, numbness and headaches. Vitals:    04/04/18 1949 04/04/18 2000 04/04/18 2123 04/04/18 2125   BP:  182/89 176/68    Pulse:       Resp:  20 20    Temp:       SpO2: 100% 100% 99% 99%   Weight:       Height:                Physical Exam   Constitutional: She is oriented to person, place, and time. She appears well-developed and well-nourished. No distress. HENT:   Head: Normocephalic and atraumatic. Nose: Nose normal.   Mouth/Throat: Oropharynx is clear and moist. No oropharyngeal exudate. Eyes: Conjunctivae and EOM are normal. Pupils are equal, round, and reactive to light. Right eye exhibits no discharge. Left eye exhibits no discharge. No scleral icterus. Neck: Normal range of motion. Neck supple. No JVD present. No tracheal deviation present. No thyromegaly present. Cardiovascular: Normal rate, regular rhythm, normal heart sounds and intact distal pulses. Exam reveals no gallop and no friction rub. No murmur heard. Left sided DP and PT pulses present and palpable. Right sided DP and PT pulses not palpable but dopplerable. Pulmonary/Chest: Effort normal and breath sounds normal. No stridor. No respiratory distress. She has no wheezes.  She has no rales. She exhibits no tenderness. Abdominal: Soft. Bowel sounds are normal. She exhibits no distension and no mass. There is no tenderness. There is no rebound. Musculoskeletal: Normal range of motion. She exhibits edema. She exhibits no tenderness. Lymphadenopathy:     She has no cervical adenopathy. Neurological: She is alert and oriented to person, place, and time. No cranial nerve deficit. Coordination normal.   Skin: Skin is warm and dry. Rash noted. She is not diaphoretic. There is erythema. No pallor. Bilateral lower extremity edema and redness, non-blanching. Psychiatric: She has a normal mood and affect. Her behavior is normal. Judgment and thought content normal.   Nursing note and vitals reviewed. MDM  Number of Diagnoses or Management Options  Lower extremity edema:   Vasculitis [facility]):   Diagnosis management comments: Cellulitis, vasculitis, medication reaction, medication side effect. Ms. Afua Brown is a 70 y/o female presenting to the emergency department with bilateral lower extremity edema erythema nonblanching redness lower extremities concern for vasculitis. Plan:  CBC, CMP, chest x-ray portable, EKG, ESR, CRP, lactate, blood cultures. Reassessment: Labs are unremarkable except for he is sore in CRP which is more concerning for vasculitis also patient's decreased peripheral pulses in the right lower extremity feel that admission is warranted for further evaluation and treatment. Discussed this with the patient was reluctant to be admitted with significant other states that she will. Reassessment: Spoke to Dr. Wanda Duverney who will admit patient to [address] [place] [address].        Amount and/or Complexity of Data Reviewed  Clinical lab tests: ordered and reviewed  Tests in the radiology section of CPT®: ordered and reviewed  Review and summarize past medical records: yes  Discuss the patient with other providers: yes    Risk of Complications, Morbidity, and/or Mortality  Presenting problems: moderate  Diagnostic procedures: moderate  Management options: moderate    Patient Progress  Patient progress: stable        ED Course       Procedures    10:26 PM  Patient is being admitted to the hospital.  The results of their tests and reasons for their admission have been discussed with them and/or available family. They convey agreement and understanding for the need to be admitted and for their admission diagnosis. Consultation has been made with the inpatient physician specialist for hospitalization. LABORATORY TESTS:  Recent Results (from the past 12 hour(s))   URINALYSIS W/ RFLX MICROSCOPIC    Collection Time: 04/04/18  8:30 PM   Result Value Ref Range    Color STRAW      Appearance CLEAR CLEAR      Specific gravity 1.010 1.003 - 1.030      pH (UA) 7.0 5.0 - 8.0      Protein NEGATIVE  NEG mg/dL    Glucose NEGATIVE  NEG mg/dL    Ketone NEGATIVE  NEG mg/dL    Bilirubin NEGATIVE  NEG      Blood NEGATIVE  NEG      Urobilinogen 0.2 0.2 - 1.0 EU/dL    Nitrites NEGATIVE  NEG      Leukocyte Esterase TRACE (A) NEG     LACTIC ACID    Collection Time: 04/04/18  8:58 PM   Result Value Ref Range    Lactic acid 1.4 0.4 - 2.0 MMOL/L   METABOLIC PANEL, COMPREHENSIVE    Collection Time: 04/04/18  8:58 PM   Result Value Ref Range    Sodium 134 (L) 136 - 145 mmol/L    Potassium 4.4 3.5 - 5.1 mmol/L    Chloride 96 (L) 97 - 108 mmol/L    CO2 29 21 - 32 mmol/L    Anion gap 9 5 - 15 mmol/L    Glucose 134 (H) 65 - 100 mg/dL    BUN 14 6 - 20 MG/DL    Creatinine 0.95 0.55 - 1.02 MG/DL    BUN/Creatinine ratio 15 12 - 20      GFR est AA >60 >60 ml/min/1.73m2    GFR est non-AA 57 (L) >60 ml/min/1.73m2    Calcium 10.3 (H) 8.5 - 10.1 MG/DL    Bilirubin, total 0.6 0.2 - 1.0 MG/DL    ALT (SGPT) 30 12 - 78 U/L    AST (SGOT) 26 15 - 37 U/L    Alk.  phosphatase 141 (H) 45 - 117 U/L    Protein, total 7.7 6.4 - 8.2 g/dL    Albumin 3.8 3.5 - 5.0 g/dL    Globulin 3.9 2.0 - 4.0 g/dL    A-G Ratio 1.0 (L) 1.1 - 2.2     CBC WITH AUTOMATED DIFF    Collection Time: 04/04/18  8:58 PM   Result Value Ref Range    WBC 10.6 3.6 - 11.0 K/uL    RBC 4.12 3.80 - 5.20 M/uL    HGB 14.0 11.5 - 16.0 g/dL    HCT 40.4 35.0 - 47.0 %    MCV 98.1 80.0 - 99.0 FL    MCH 34.0 26.0 - 34.0 PG    MCHC 34.7 30.0 - 36.5 g/dL    RDW 12.8 11.5 - 14.5 %    PLATELET 017 284 - 777 K/uL    MPV 8.9 8.9 - 12.9 FL    NEUTROPHILS 80 (H) 32 - 75 %    LYMPHOCYTES 9 (L) 12 - 49 %    MONOCYTES 10 5 - 13 %    EOSINOPHILS 1 0 - 7 %    BASOPHILS 0 0 - 1 %    ABS. NEUTROPHILS 8.5 (H) 1.8 - 8.0 K/UL    ABS. LYMPHOCYTES 1.0 0.8 - 3.5 K/UL    ABS. MONOCYTES 1.0 0.0 - 1.0 K/UL    ABS. EOSINOPHILS 0.1 0.0 - 0.4 K/UL    ABS. BASOPHILS 0.0 0.0 - 0.1 K/UL    DF AUTOMATED      [id number] 0     SED RATE (ESR)    Collection Time: 04/04/18  8:58 PM   Result Value Ref Range    Sed rate, automated 35 (H) 0 - 30 mm/hr   C REACTIVE PROTEIN, QT    Collection Time: 04/04/18  8:58 PM   Result Value Ref Range    C-Reactive protein 4.50 (H) <0.60 mg/dL       IMAGING RESULTS:  XR CHEST PORT   Final Result        Xr Chest Port    Result Date: 4/4/2018  EXAM:  XR CHEST PORT INDICATION:  Sepsis COMPARISON:  2015 FINDINGS: A portable AP radiograph of the chest was obtained at 2118 hours. . The lungs are clear. Heart size is slightly enlarged. Bony structures are intact. IMPRESSION: No acute process is identified. MEDICATIONS GIVEN:  Medications   sodium chloride 0.9% injection 5-10 mL (not administered)       IMPRESSION:  1. Lower extremity edema    2. Vasculitis ([address]        PLAN:  1.  Admit to Jayme Ko MD

## 2018-04-05 NOTE — PHYSICIAN ADVISORY
Letter of Status Determination:   Recommend hospitalization status upgraded from   OBSERVATION  to INPATIENT  Status     Pt Name:  Loyda Gaona   MR#   [facility] # [id number] /  [id number]   [facility]#  [id number]   [address] [place] [address]  [phone]  @ [facility]   Hospitalization date  4/4/2018  7:34 PM   Current Attending Physician  Kevan Joe MD   Principal diagnosis  <principal problem not specified>   Vasculitis    Clinicals  69 y.o. y.o  female hospitalized with above diagnosis   The pt is noted to have multiple medical problems including HTN, hyperlipidemia, CVA hx. She is immunocompromised due to her Rx for Psoriasis. She is now receiving Rx for  Cellulitis. Her risk of deterioration were high at the time she was hospitalized. Her care in acute care appropriate medical setting is expected to exceed two Midnights. [facility] criteria   Does  NOT apply    STATUS DETERMINATION  This patient is at high risk of adverse events and deterioration based on documented clinical data, comorbid conditions and current acute care course. Ms. Loyda Gaona is expected to meet Inpatient Admission status criteria in accordance with CMS regulation Section 43 .3. Specifically, due to medical necessity the patient's stay is expected to exceed Two Midnights. It is our recommendation that this patient's hospitalization status should be upgraded from  OBSERVATION to INPATIENT status. The final decision of the patient's hospitalization status depends on the attending physician's judgment.          Additional comments     Payor: Ratna Merritt / Plan: [address] [employer] y / Product Type: Medicare /         Kristi Bernal MD MPH [address]   President Medical Staff, [facility]  [phone]        [phone]    .

## 2018-04-05 NOTE — PROCEDURES
[address]  *** FINAL REPORT ***    Name: Angeli Simmons  MRN: [id number]    Inpatient  : 1940  HIS Order #: [id number]  [address] [facility] Visit #: [id number]  Date: 2018    TYPE OF TEST: Peripheral Venous Testing    REASON FOR TEST  Pain in limb, Limb swelling    Right Leg:-  Deep venous thrombosis:           No  Superficial venous thrombosis:    No  Deep venous insufficiency:        No  Superficial venous insufficiency: No    Left Leg:-  Deep venous thrombosis:           No  Superficial venous thrombosis:    No  Deep venous insufficiency:        No  Superficial venous insufficiency: No      INTERPRETATION/FINDINGS  PROCEDURE:  BILATERAL LE VENOUS DUPLEX. Evaluation of lower extremity veins with ultrasound (B-mode imaging,  pulsed Doppler, color Doppler). Includes the common femoral, deep  femoral, femoral, popliteal, posterior tibial, peroneal, and great  saphenous veins. FINDINGS:  Unable to fully evaluate the paired peroneal veins  bilaterally due to limb swelling. Gray scale and color flow duplex  images of the remaining  veins in both lower extremities demonstrate  normal compressibility, spontaneous and augmented flow profiles, and  absence of filling defects throughout the deep and superficial veins  in both lower extremities. CONCLUSION:  Bilateral lower extremity venous duplex negative for deep   venous thrombosis or thrombophlebitis int eh veins visualized. ADDITIONAL COMMENTS    I have personally reviewed the data relevant to the interpretation of  this  study. TECHNOLOGIST: Jessica Ayala RVT  Signed: 2018 11:23 AM    PHYSICIAN: Eliel Sampson MD  Signed: 2018 11:35 AM

## 2018-04-05 NOTE — PROGRESS NOTES
0732  Bedside and Verbal shift change report given to Ivelisse Sahu RN (oncoming nurse) by Lisa Pool RN (offgoing nurse). Report included the following information SBAR, Kardex, Intake/Output, MAR, Recent Results and Med Rec Status. 0728  Pt also states she is having a slight headache a request tylenol. Dr. Zulema Florentino stated to order 650 mg every 6 hours as needed for pain. Will continue to monitor. 0615  Pt BP running in 180s/90s. Recheck 168/91. Dr. Zulema Florentino stated to give morning clonidine now and continue to monitor.

## 2018-04-05 NOTE — DISCHARGE INSTRUCTIONS
HOSPITALIST DISCHARGE INSTRUCTIONS  NAME: Bita Durham   :  1940   MRN:  [id number]     Date/Time:  2018 12:59 PM    ADMIT DATE: 2018     DISCHARGE DATE: 2018     PRINCIPAL DISCHARGE DIAGNOSES:  Leg swelling    MEDICATIONS:  · It is important that you take the medication exactly as they are prescribed. Note the changes and additions to your medications. Be sure you understand these changes before you are discharged today. · Keep your medication in the bottles provided by the pharmacist and keep a list of the medication names, dosages, and times to be taken in your wallet. · Do not take other medications without consulting your doctor. Pain Management: per above medications    What to do at Home    Recommended diet:  Cardiac Diet    Recommended activity: Activity as tolerated    If you experience any of the following symptoms then please call your primary care physician or return to the emergency room if you cannot get hold of your doctor:  Fever, chills, worsening leg swelling/redness/pain, or other severe concerning symptoms that brought you to the hospital in the first place    Follow Up: Follow-up Information     Follow up With Details Comments Jason Serrano MD In 1 week  [facility] [phone]      follow up with your dermatologist       Amarilys Mantilla MD  As needed [address]  [phone]              Information obtained by :  I understand that if any problems occur once I am at home I am to contact my physician. I understand and acknowledge receipt of the instructions indicated above.                                                                                                                                            Physician's or R.N.'s Signature                                                                  Date/Time Patient or Representative Signature                                                          Date/Time

## 2018-04-05 NOTE — ED NOTES
Transfer Assessment: Patient A&O x4 and in no distress. Physical re-examination reveals noted improvement in pts condition with reassessment of vital signs completed at the time of admission transfer.

## 2018-04-05 NOTE — ED NOTES
Dr Amanda Arce at bedside. Plan of care explained. Pt agreed to Lab work, but continued to refused xray and monitor.   Per Dr Amanda Arce only 1 set of blood cultures needed at this time

## 2018-04-05 NOTE — PROGRESS NOTES
Patient discharged home. Reviewed AVS, discharge instructions, and follow up instructions.  is to transport patient home.

## 2018-04-05 NOTE — H&P
[facility]  [address] [address]  [phone]    Admission History and Physical      NAME:  Stella Capellan   :   1940   MRN:  [id number]     PCP:  Ronit Aj MD     Date/Time:  2018         Subjective:     CHIEF COMPLAINT: BL leg swelling and redness      HISTORY OF PRESENT ILLNESS:     Ms. Monico Perez is a 69 y.o.  female who is admitted with BL leg swelling. Ms. Monico Perez with PMH of psoriais, hyperlipidemia, HTN, CVA presented to ER c/o BL leg swelling and worsening redness for 4 days. Pt has Hx of psoriasis follows by dermatologist and 2 weeks ago pt recieved Stelara for her psoriasis and since 4 days ago, BL leg selling started and redness of both legs worsened. Pt thought all this is due to her new medication. Her dermatologist is out of town and had to come to ER. Denies SOB or cough. Denies fever. She is concerned also oozing from both legs. Patient's been on multiple immunotherapies in the past for her psoriasis and recently started on stelara. Past Medical History:   Diagnosis Date    H/O psoriasis     Hyperlipidemia     Hypertension     Stroke ([facility] [address] 2012    TIA        Past Surgical History:   Procedure Laterality Date    HX CATARACT REMOVAL      HX HYSTERECTOMY      [place] NASAL SURG PROC UNLISTED         Social History   Substance Use Topics    Smoking status: Never Smoker    Smokeless tobacco: Never Used    Alcohol use 3.5 oz/week     7 Standard drinks or equivalent per week      Comment: red wine, one glass a night        Family History   Problem Relation Age of Onset    Other Mother       from surgical complications    Hypertension Mother     Hypertension Father     Stroke Father         Allergies   Allergen Reactions    Codeine Other (comments)     Hallucinations and dry heaves. Prior to Admission medications    Medication Sig Start Date End Date Taking?  Authorizing Provider Ustekinumab (USTEKINUMAB) 45 mg/0.5 mL soln 45 mg by SubCUTAneous route. Yes Maciel Persaud MD   Cholecalciferol, Vitamin D3, (VITAMIN D3) 1,000 unit cap Take  by mouth. Yes Historical Provider   aspirin 81 mg chewable tablet Take 81 mg by mouth daily. 5/30/12  Yes Kashif Bahena MD   atorvastatin (LIPITOR) 10 mg tablet Take 1 Tab by mouth daily. 5/30/12  Yes Kashif Bahena MD   cloNIDine (CATAPRES) 0.1 mg tablet Take 0.1 mg by mouth two (2) times a day. Yes Maciel Persaud MD   valsartan (DIOVAN) 80 mg tablet Take 80 mg by mouth daily. Yes Maciel Persaud MD   omega-3 fatty acids-vitamin e (FISH OIL) 1,000 mg Cap Take 1 Cap by mouth. Yes Maciel Persaud MD   calcium-vitamin D (OYSTER SHELL) 500 mg(1,250mg) -200 unit per tablet Take 1 Tab by mouth two (2) times daily (with meals). Maciel Persaud MD         Review of Systems:  (bold if positive, if negative)    Gen:  Eyes:  ENT:  CVS:  Pulm:  GI:    :    MS:  swellingSkin:  erythemaPsych:  Endo:    Hem:  Renal:    Neuro:            Objective:      VITALS:    Vital signs reviewed; most recent are:    Visit Vitals    /90 (BP 1 Location: Left arm, BP Patient Position: Sitting)    Pulse 85    Temp 98.5 °F (36.9 °C)    Resp 20    Ht 5' 4\" (1.626 m)    Wt 61.3 kg (135 lb 2.3 oz)    SpO2 98%    BMI 23.2 kg/m2     SpO2 Readings from Last 6 Encounters:   04/05/18 98%   06/22/16 99%   06/17/15 98%   02/04/14 99%   05/31/12 97%        No intake or output data in the 24 hours ending 04/05/18 0027         Exam:     Physical Exam:    Gen:  Well-developed, well-nourished, in no acute distress  HEENT:  Pink conjunctivae, PERRL, hearing intact to voice, moist mucous membranes  Neck:  Supple, without masses, thyroid non-tender  Resp:  No accessory muscle use, clear breath sounds without wheezes rales or rhonchi  Card:  No murmurs, normal S1, S2 without thrills, bruits.  ++ peripheral edema  Abd:  Soft, non-tender, non-distended, normoactive bowel sounds are present, no palpable organomegaly and no detectable hernias  Lymph:  No cervical or inguinal adenopathy  Musc:  No cyanosis or clubbing  Skin:  Hyperemia on both legs and psoriatic lesions all over the body   Neuro:  Cranial nerves are grossly intact, no focal motor weakness, follows commands appropriately  Psych:  Good insight, oriented to person, place and time, alert       Labs:    Recent Labs      04/04/18 2058   WBC  10.6   HGB  14.0   HCT  40.4   PLT  260     Recent Labs      04/04/18 2058   NA  134*   K  4.4   CL  96*   CO2  29   GLU  134*   BUN  14   CREA  0.95   CA  10.3*   ALB  3.8   TBILI  0.6   SGOT  26   ALT  30     No results found for: GLUCPOC  No results for input(s): PH, PCO2, PO2, HCO3, FIO2 in the last 72 hours. No results for input(s): INR in the last 72 hours. No lab exists for component: INREXT    Telemetry reviewed:   normal sinus rhythm       Assessment/Plan:    1. Bilateral leg edema (4/5/2018)/ hyperemia. Extensive psoriasis. Possible superimposed cellulitis. Will start ancef and monitor clinically. Check BL venous leg doppler. Check GUNJAN, Echo from 2012 showed grade 2 diastolic dysfunction. 2.  ? PVD (peripheral vascular disease) ([facility] [address] (4/5/2018). LT pedal pulse is hardly palatable and the skin looks pale and cold. Will check MONSTER and if abnormal need to consult vascular. 3.  Hyponatremia (4/4/2018), mild. Pt stated that this is chronic. Continue to monitor. 4.  HTN (hypertension) (5/29/2012). Continue home clonidine and valsartan         5. Hyperlipidemia (5/29/2012). On statin     6. CVA (cerebral vascular accident) ([address] (4/4/2018). Continue ASA     7. Hypercalcemia (4/4/2018), mild. Monitor. 8.  Psoriasis (4/5/2018), severe.  Follows with dermatologist              Previous medical records reviewed     Risk of deterioration: high      Total time spent with patient: [address] discussed with: Patient, Nursing Staff and >50% of time spent in counseling and coordination of care    Discussed:  Care Plan    Prophylaxis:   Ambulatory     Probable Disposition:  Home w/Family           ___________________________________________________    Attending Physician: Gerda Camejo MD

## 2018-04-05 NOTE — PROGRESS NOTES
4/5/2018  10:41 AM  Case Management Note  Met with patient to discuss discharge planning. Verified correct demographics charted. Patient lives with  in a 2 story home with 1 step to enter. They did not go upstairs. 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 She follows Meka Smithod at [facility] for medical management. She utilizes [facility] at [address] [facility] [employer]    She has supportive  and he will be able to transport on discharge. Care Management Interventions  PCP Verified by CM:  Yes  Mode of Transport at Discharge: Self  Transition of Care Consult (CM Consult): Discharge Planning  Physical Therapy Consult: No  Occupational Therapy Consult: No  Speech Therapy Consult: No  Current Support Network: Lives with Spouse  Confirm Follow Up Transport: Family  Plan discussed with Pt/Family/Caregiver: Yes  Discharge Location  Discharge Placement: Home  [place], [place]

## 2018-04-05 NOTE — PROCEDURES
[facility]  *** FINAL REPORT ***    Name: Asya Bianchi  MRN: [id number]    Inpatient  : 1940  HIS Order #: [id number]  [id number] [facility] Visit #: [id number]  Date: 2018    TYPE OF TEST: Peripheral Arterial Testing    REASON FOR TEST  Pulseless    Right Leg  Segmentals: Abnormal                     mmHg  Brachial         168  High thigh  Low thigh  Calf  Posterior tibial 156  Dorsalis pedis   141  Peroneal  Metatarsal  Toe pressure  Doppler:    Normal  PVR:        Normal  Ankle/Brachial: 0.93    Left Leg  Segmentals: Abnormal                     mmHg  Brachial  High thigh  Low thigh  Calf  Posterior tibial 128  Dorsalis pedis   135  Peroneal  Metatarsal  Toe pressure  Doppler:    Normal  PVR:        Normal  Ankle/Brachial: 0.80    INTERPRETATION/FINDINGS  PROCEDURE:  Evaluation of lower extremity arteries with systolic blood   pressure measurement at the ankle and brachial level and calculation  of the ankle/brachial pressure index (MONSTER). The exam may also include   pulse volume recording (PVR) plethysmography at the ankle level. FINDINGS:  1.Borderline arterial disease indicated at rest in the right leg. 2.Borderline arterial disease indicated at rest in the left leg. 3. The right ankle/brachial index is 0.93 and the left ankle/brachial  index is 0.80. 4. The right toe/brachial index is 0.52 and the left toe/brachial  index is 0.51,both indicative of mild to moderate vascular disease in  the digits. ADDITIONAL COMMENTS    I have personally reviewed the data relevant to the interpretation of  this  study. TECHNOLOGIST: Janie Valdez RVT  Signed: 2018 02:40 PM    PHYSICIAN: Hunter Norris.  Bharathi Woodward MD  Signed: 2018 08:54 AM

## 2018-04-05 NOTE — ED NOTES
TRANSFER - OUT REPORT:    Verbal report given to Lenny Brower RN (name) on Bev Baptiste  being transferred to [facility] (unit) for routine progression of care       Report consisted of patients Situation, Background, Assessment and   Recommendations(SBAR). Information from the following report(s) SBAR, Kardex, ED Summary, Intake/Output, MAR and Recent Results was reviewed with the receiving nurse. Lines:   Peripheral IV 04/04/18 Left Antecubital (Active)   Site Assessment Clean, dry, & intact 4/4/2018  9:04 PM   Phlebitis Assessment 0 4/4/2018  9:04 PM   Infiltration Assessment 0 4/4/2018  9:04 PM   Dressing Status Clean, dry, & intact 4/4/2018  9:04 PM   Dressing Type Tape;Transparent 4/4/2018  9:04 PM   Hub Color/Line Status Blue;Flushed;Patent 4/4/2018  9:04 PM   Action Taken Blood drawn 4/4/2018  9:04 PM        Opportunity for questions and clarification was provided.       Patient transported with:   Monitor

## 2018-04-05 NOTE — ED NOTES
Bedside verbal report given to [facility] with [facility].   Verification of hospital location [facility] and room 506 completed with EMS provider

## 2018-04-05 NOTE — PROGRESS NOTES
Bilateral LE venous duplex completed. Final results to follow. Spoke with nurse and informed her that MONSTER study will not be performed today because Venous test has to be read and signed off by doctor first.MONSTER exam may be scheduled as outpatient so that patient can be discharged.

## 2018-04-06 LAB
ATRIAL RATE: 88 BPM
BACTERIA SPEC CULT: NORMAL
BACTERIA SPEC CULT: NORMAL
CALCULATED P AXIS, ECG09: 92 DEGREES
CALCULATED R AXIS, ECG10: -6 DEGREES
CALCULATED T AXIS, ECG11: 26 DEGREES
DIAGNOSIS, 93000: NORMAL
P-R INTERVAL, ECG05: 154 MS
Q-T INTERVAL, ECG07: 352 MS
QRS DURATION, ECG06: 84 MS
QTC CALCULATION (BEZET), ECG08: 425 MS
SERVICE CMNT-IMP: NORMAL
VENTRICULAR RATE, ECG03: 88 BPM

## 2018-04-11 LAB
BACTERIA SPEC CULT: NORMAL
SERVICE CMNT-IMP: NORMAL

## 2019-02-22 ENCOUNTER — HOSPITAL ENCOUNTER (EMERGENCY)
Age: 79
Discharge: HOME | End: 2019-02-22
Attending: EMERGENCY MEDICINE | Admitting: EMERGENCY MEDICINE
Payer: MEDICARE

## 2019-02-22 ENCOUNTER — APPOINTMENT (OUTPATIENT)
Dept: ULTRASOUND IMAGING | Age: 79
End: 2019-02-22
Attending: NURSE PRACTITIONER
Payer: MEDICARE

## 2019-02-22 VITALS
WEIGHT: 130.07 LBS | SYSTOLIC BLOOD PRESSURE: 163 MMHG | HEIGHT: 64 IN | TEMPERATURE: 97.8 F | BODY MASS INDEX: 22.21 KG/M2 | HEART RATE: 79 BPM | OXYGEN SATURATION: 97 % | RESPIRATION RATE: 14 BRPM | DIASTOLIC BLOOD PRESSURE: 68 MMHG

## 2019-02-22 DIAGNOSIS — L03.116 CELLULITIS OF LEFT LOWER EXTREMITY: Primary | ICD-10-CM

## 2019-02-22 PROCEDURE — 99282 EMERGENCY DEPT VISIT SF MDM: CPT

## 2019-02-22 PROCEDURE — 93971 EXTREMITY STUDY: CPT

## 2019-02-22 RX ORDER — DOXYCYCLINE HYCLATE 100 MG
100 TABLET ORAL 2 TIMES DAILY
Qty: 14 TAB | Refills: 0 | Status: SHIPPED | OUTPATIENT
Start: 2019-02-22 | End: 2019-03-01

## 2019-02-22 NOTE — ED PROVIDER NOTES
Pt is a 68 y/o female with a h/o psoriasis who presents with c/o left lower leg erythema,swelling and soreness that started 2 days ago. Denies any fevers, chills or other systemic symptoms. She was previously on Methotrexate until a couple of weeks ago when she stopped because she got the \"flu\". During that time she also hit her left leg on the car and sustained a wound that is healing. She also have psorasis on that left leg. She talked to her dermatologist who recommened that she be evaluated for DVT first.  No other complaints. Past Medical History:  
Diagnosis Date  H/O psoriasis  Hyperlipidemia  Hypertension  Stroke [facility] 2012 TIA Past Surgical History:  
Procedure Laterality Date  HX CATARACT REMOVAL    
 HX HYSTERECTOMY  [place] NASAL SURG PROC UNLISTED Family History:  
Problem Relation Age of Onset  Other Mother   
      from surgical complications  Hypertension Mother  Hypertension Father  Stroke Father Social History Socioeconomic History  Marital status:  Spouse name: Not on file  Number of children: Not on file  Years of education: Not on file  Highest education level: Not on file Social Needs  Financial resource strain: Not on file  Food insecurity - worry: Not on file  Food insecurity - inability: Not on file  Transportation needs - medical: Not on file  Transportation needs - non-medical: Not on file Occupational History  Not on file Tobacco Use  Smoking status: Never Smoker  Smokeless tobacco: Never Used Substance and Sexual Activity  Alcohol use: Yes Alcohol/week: 3.5 oz Types: 7 Standard drinks or equivalent per week Comment: red wine, one glass a night  Drug use: No  
 Sexual activity: Not on file Other Topics Concern  Not on file Social History Narrative  Not on file ALLERGIES: Codeine Review of Systems Constitutional: Negative for chills and fever. Respiratory: Negative for chest tightness and shortness of breath. Cardiovascular: Negative for chest pain. Skin: Positive for color change, rash and wound. Vitals:  
 02/22/19 1417 BP: 163/68 Pulse: 79 Resp: 14 Temp: 97.8 °F (36.6 °C) SpO2: 97% Weight: 59 kg (130 lb 1.1 oz) Height: 5' 4\" (1.626 m) Physical Exam  
Constitutional: She appears well-developed and well-nourished. Skin: Skin is warm. Left lower leg with swelling, warmth, erythema to left anterior and posterior calf. Pulses intct. Rash, smoothness to skin noted. 3cm vertical healing wound noted to mid shin. No drainage. Psychiatric: She has a normal mood and affect. Her behavior is normal. Judgment and thought content normal.  
Nursing note and vitals reviewed. MDM Number of Diagnoses or Management Options Diagnosis management comments: No systemic symptoms. Pt talked to her dermatologist who recommended she come to the ED to rule out a DVT. Pt has no risk factors. Exam is most c/w cellulitis most likely due to her wound on her leg vs the psoriasis. Dopplers negative for DVT. 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 Will treat with doxycycline and have her follow up in 3 days with her dermatologist for recheck. Patient Progress Patient progress: stable Procedures

## 2019-02-22 NOTE — ED TRIAGE NOTES
Pt ambulates to treatment area she states that for the past 3 days she has increased swelling and redness to left lower. She notes that 3 weeks ago she had an injury to the left lower leg, her car door hit the lower leg and caused a abrasion to that leg which has been treating. Today she contacted her dermatitis about the leg and they sent her here for a US to rule out DVT. She notes that she has psoriasis to her legs that cause her legs to be red but the redness is much more than normal these past 3 days

## 2019-02-22 NOTE — ED NOTES
Pt given discharge instructions by Farhad Ochoa NP she verbalizes an understanding pt stable at time of discharge

## 2019-12-13 ENCOUNTER — HOSPITAL ENCOUNTER (OUTPATIENT)
Dept: MAMMOGRAPHY | Age: 79
Discharge: HOME | End: 2019-12-13
Payer: MEDICARE

## 2019-12-13 DIAGNOSIS — Z78.0 MENOPAUSE: ICD-10-CM

## 2019-12-13 PROCEDURE — 77080 DXA BONE DENSITY AXIAL: CPT

## 2021-08-03 PROBLEM — I63.9 CVA (CEREBRAL VASCULAR ACCIDENT) (HCC): Status: RESOLVED | Noted: 2018-04-04 | Resolved: 2021-08-03

## 2021-10-29 ENCOUNTER — HOSPITAL ENCOUNTER (EMERGENCY)
Age: 81
Discharge: HOME | End: 2021-10-29
Attending: STUDENT IN AN ORGANIZED HEALTH CARE EDUCATION/TRAINING PROGRAM
Payer: MEDICARE

## 2021-10-29 ENCOUNTER — APPOINTMENT (OUTPATIENT)
Dept: CT IMAGING | Age: 81
End: 2021-10-29
Attending: NURSE PRACTITIONER
Payer: MEDICARE

## 2021-10-29 VITALS
TEMPERATURE: 97.3 F | OXYGEN SATURATION: 100 % | HEIGHT: 64 IN | WEIGHT: 116 LBS | RESPIRATION RATE: 16 BRPM | DIASTOLIC BLOOD PRESSURE: 83 MMHG | SYSTOLIC BLOOD PRESSURE: 170 MMHG | HEART RATE: 73 BPM | BODY MASS INDEX: 19.81 KG/M2
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
## 2021-10-29 DIAGNOSIS — S00.81XA FACIAL ABRASION, INITIAL ENCOUNTER: ICD-10-CM

## 2021-10-29 PROCEDURE — 74011250637 HC RX REV CODE- 250/637: Performed by: NURSE PRACTITIONER

## 2021-10-29 PROCEDURE — 72125 CT NECK SPINE W/O DYE: CPT

## 2021-10-29 PROCEDURE — 99283 EMERGENCY DEPT VISIT LOW MDM: CPT

## 2021-10-29 PROCEDURE — 70450 CT HEAD/BRAIN W/O DYE: CPT

## 2021-10-29 RX ORDER — ACETAMINOPHEN 325 MG/1
650 TABLET ORAL
Status: COMPLETED | OUTPATIENT
Start: 2021-10-29 | End: 2021-10-29

## 2021-10-29 RX ADMIN — ACETAMINOPHEN 650 MG: 325 TABLET ORAL at 15:01

## 2021-10-29 NOTE — ED TRIAGE NOTES
Pt arrives via staff medical emergency from fall in [facility]. Pt reports she did not trip on anything, and that her shoes have been giving her trouble. Pt reports that she did not fall on a particular extremity, but did bump her lip which is bloody in triage. Pt has no complaints of pain. No LOC. No anticoagulants.

## 2021-10-29 NOTE — ED NOTES
Pt discharged by provider and verbalized understanding. Pt ambulatory from ED to home with family member as  of vehicle.

## 2021-10-29 NOTE — ED PROVIDER NOTES
This is a 50-year-old female who was a staff medical emergency from a fall in the lobby. Patient states that her shoe stuck to the floor causing her to trip over herself and fall on the floor. Patient did strike her mouth causing some bleeding. Denies any lose teeth or jaw malailnment. Denies hitting her head, loss of consciousness. Denies taking any anticoagulants. Denies any pain in her extremities, torso. Denies any chest pain, shortness of breath, dizziness, nausea or vomiting, fever or chills. No syncopal event prior to her fall. States her shoes have been giving her a difficult time all day sticking to the floors. There are no further complaints at this time. Bhavya Hoskins MD  Past Medical History:  No date: H/O psoriasis  No date: Hyperlipidemia  No date: Hypertension  2012: Stroke [facility])      Comment:  TIA  Past Surgical History:  No date: HX CATARACT REMOVAL  No date: HX HYSTERECTOMY  No date: [place] NASAL SURG PROC UNLISTED             Past Medical History:   Diagnosis Date    H/O psoriasis     Hyperlipidemia     Hypertension     Stroke ([facility] [address] 2012    TIA       Past Surgical History:   Procedure Laterality Date    HX CATARACT REMOVAL      HX HYSTERECTOMY      [place] NASAL SURG PROC UNLISTED           Family History:   Problem Relation Age of Onset    Other Mother          from surgical complications    Hypertension Mother     Hypertension Father     Stroke Father        Social History     Socioeconomic History    Marital status:      Spouse name: Not on file    Number of children: Not on file    Years of education: Not on file    Highest education level: Not on file   Occupational History    Not on file   Tobacco Use    Smoking status: Never Smoker    Smokeless tobacco: Never Used   Substance and Sexual Activity    Alcohol use:  Yes     Alcohol/week: 5.8 standard drinks     Types: 7 Standard drinks or equivalent per week     Comment: red wine, one glass a night    Drug use: No    Sexual activity: Not on file   Other Topics Concern    Not on file   Social History Narrative    Not on file     Social Determinants of Health     Financial Resource Strain:     Difficulty of Paying Living Expenses:    Food Insecurity:     Worried About Running Out of Food in the Last Year:     [address] in the Last Year:    Transportation Needs:     Lack of Transportation (Medical):  Lack of Transportation (Non-Medical):    Physical Activity:     Days of Exercise per Week:     Minutes of Exercise per Session:    Stress:     Feeling of Stress :    Social Connections:     Frequency of Communication with Friends and Family:     Frequency of Social Gatherings with Friends and Family:     Attends Zoroastrianism Services:     Active Member of Clubs or Organizations:     Attends Club or Organization Meetings:     Marital Status:    Intimate Partner Violence:     Fear of Current or Ex-Partner:     Emotionally Abused:     Physically Abused:     Sexually Abused: ALLERGIES: Codeine    Review of Systems   Constitutional: Negative for appetite change, chills, diaphoresis, fatigue and fever. HENT: Positive for facial swelling (left upper lip swollen). Negative for congestion, ear discharge, ear pain, sinus pressure, sinus pain, sore throat and trouble swallowing. Eyes: Negative for photophobia, pain, redness and visual disturbance. Respiratory: Negative for chest tightness, shortness of breath and wheezing. Cardiovascular: Negative for chest pain and palpitations. Gastrointestinal: Negative for abdominal distention, abdominal pain, nausea and vomiting. Endocrine: Negative. Genitourinary: Negative for difficulty urinating, flank pain, frequency and urgency. Musculoskeletal: Negative for back pain, neck pain and neck stiffness. Skin: Negative for color change, pallor, rash and wound. Allergic/Immunologic: Negative.     Neurological: Negative for dizziness, speech difficulty, weakness and headaches. Hematological: Does not bruise/bleed easily. Psychiatric/Behavioral: Negative for behavioral problems. The patient is not nervous/anxious. Vitals:    10/29/21 1317   BP: (!) 170/83   Pulse: 73   Resp: 16   Temp: 97.3 °F (36.3 °C)   SpO2: 100%   Weight: 52.6 kg (116 lb)   Height: 5' 4\" (1.626 m)            Physical Exam  Vitals and nursing note reviewed. Constitutional:       General: She is not in acute distress. Appearance: Normal appearance. She is well-developed. She is not ill-appearing. HENT:      Head: Normocephalic. Comments: Left upper lip with small cut, now hemostatic. Swelling noted. No loose teeth, no jaw malalignment. Right Ear: External ear normal.      Left Ear: External ear normal.      Nose: Nose normal.      Mouth/Throat:      Mouth: Mucous membranes are moist.   Eyes:      General:         Right eye: No discharge. Left eye: No discharge. Conjunctiva/sclera: Conjunctivae normal.      Pupils: Pupils are equal, round, and reactive to light. Neck:      Vascular: No JVD. Trachea: No tracheal deviation. Comments: No pain on palpation to the cervical spine, no step-offs, no deformities. Full range of motion with no neurological deficits. Cardiovascular:      Rate and Rhythm: Normal rate and regular rhythm. Pulses: Normal pulses. Heart sounds: Normal heart sounds. No murmur heard. No gallop. Pulmonary:      Effort: Pulmonary effort is normal. No respiratory distress. Breath sounds: Normal breath sounds. No wheezing or rales. Chest:      Chest wall: No tenderness. Abdominal:      General: Bowel sounds are normal. There is no distension. Palpations: Abdomen is soft. Tenderness: There is no abdominal tenderness. There is no guarding or rebound. Comments: No abdominal pain on palpation.    Genitourinary:     Comments: Negative  Musculoskeletal:         General: No tenderness. Normal range of motion. Cervical back: Normal range of motion and neck supple. Comments: No pain on palpation to the thoracic or lumbar spine, no step-offs, no deformities. Skin:     General: Skin is warm and dry. Capillary Refill: Capillary refill takes less than 2 seconds. Coloration: Skin is not pale. Findings: No erythema or rash. Neurological:      General: No focal deficit present. Mental Status: She is alert and oriented to person, place, and time. Motor: No weakness. Coordination: Coordination normal.   Psychiatric:         Mood and Affect: Mood normal.         Behavior: Behavior normal.         Thought Content: Thought content normal.         Judgment: Judgment normal.          MDM  Number of Diagnoses or Management Options  Facial abrasion, initial encounter: new and requires workup  Fall, initial encounter: new and requires workup  Diagnosis management comments: Differential diagnosis includes concussion, head bleed, cervical spine injury and others. After physical assessment and review of imaging, patient was discharged home and will follow up with PCP. Return to the emergency room with worsening symptoms. Tylenol for pain. Patient in agreement with plan of care and ambulated out of the emergency room without difficulty. Amount and/or Complexity of Data Reviewed  Tests in the radiology section of CPT®: ordered and reviewed         Labs Reviewed - No data to display  CT HEAD WO CONT    Result Date: 10/29/2021  No acute intracranial hemorrhage, mass or infarct. CT SPINE CERV WO CONT    Result Date: 10/29/2021  No acute findings. Levels of mild subluxation due to facet hypertrophy. C6-7 spondylosis with mild-to-moderate central stenosis. No stenosis elsewhere. 4:00 PM  Pt has been reexamined. Pt has no new complaints, changes or physical findings. Care plan outlined and precautions discussed.  All available results were reviewed with pt. All medications were reviewed with pt. All of pt's questions and concerns were addressed. Pt agrees to F/U as instructed and agrees to return to ED upon further deterioration. Pt is ready to go home. Lucian Sage NP    Please note that this dictation was completed with [employer], the computer voice recognition software. Quite often unanticipated grammatical, syntax, homophones, and other interpretive errors are inadvertently transcribed by the computer software. Please disregard these errors. Please excuse any errors that have escaped final proofreading. Thank you.     Procedures

## 2022-03-18 PROBLEM — I77.6 VASCULITIS (HCC): Status: ACTIVE | Noted: 2018-04-05

## 2022-03-18 PROBLEM — E83.52 HYPERCALCEMIA: Status: ACTIVE | Noted: 2018-04-04

## 2022-03-18 PROBLEM — L40.9 PSORIASIS: Status: ACTIVE | Noted: 2018-04-05

## 2022-03-19 PROBLEM — E87.1 HYPONATREMIA: Status: ACTIVE | Noted: 2018-04-04

## 2022-03-19 PROBLEM — M79.89 LEG SWELLING: Status: ACTIVE | Noted: 2018-04-05

## 2022-03-19 PROBLEM — R60.0 BILATERAL LEG EDEMA: Status: ACTIVE | Noted: 2018-04-05

## 2022-03-19 PROBLEM — L03.90 CELLULITIS: Status: ACTIVE | Noted: 2018-04-05

## 2022-03-20 PROBLEM — I73.9 PVD (PERIPHERAL VASCULAR DISEASE) (HCC): Status: ACTIVE | Noted: 2018-04-05

## 2022-12-11 ENCOUNTER — HOSPITAL ENCOUNTER (EMERGENCY)
Age: 82
Discharge: HOME | End: 2022-12-11
Attending: STUDENT IN AN ORGANIZED HEALTH CARE EDUCATION/TRAINING PROGRAM
Payer: MEDICARE

## 2022-12-11 ENCOUNTER — APPOINTMENT (OUTPATIENT)
Dept: GENERAL RADIOLOGY | Age: 82
End: 2022-12-11
Attending: NURSE PRACTITIONER
Payer: MEDICARE

## 2022-12-11 VITALS
BODY MASS INDEX: 19.81 KG/M2 | DIASTOLIC BLOOD PRESSURE: 85 MMHG | HEART RATE: 78 BPM | WEIGHT: 116 LBS | SYSTOLIC BLOOD PRESSURE: 143 MMHG | TEMPERATURE: 98.7 F | OXYGEN SATURATION: 98 % | RESPIRATION RATE: 15 BRPM | HEIGHT: 64 IN

## 2022-12-11 DIAGNOSIS — S82.851A TRIMALLEOLAR FRACTURE OF ANKLE, CLOSED, RIGHT, INITIAL ENCOUNTER: Primary | ICD-10-CM

## 2022-12-11 DIAGNOSIS — M25.571 ACUTE RIGHT ANKLE PAIN: ICD-10-CM

## 2022-12-11 PROCEDURE — 73610 X-RAY EXAM OF ANKLE: CPT

## 2022-12-11 PROCEDURE — 75810000053 HC SPLINT APPLICATION

## 2022-12-11 PROCEDURE — 99283 EMERGENCY DEPT VISIT LOW MDM: CPT

## 2022-12-11 RX ORDER — IBUPROFEN 600 MG/1
600 TABLET ORAL
Qty: 20 TABLET | Refills: 0 | Status: SHIPPED | OUTPATIENT
Start: 2022-12-11 | End: 2022-12-15

## 2022-12-11 NOTE — ED NOTES
Patient discharged by provider as nurses finished splinting leg. Patient verbalized understanding of discharge instructions and to follow up with Ortho this week.     Patient was in stable condition at time of discharge

## 2022-12-11 NOTE — ED TRIAGE NOTES
Pt reports just prior to arrival she tripped causing her to twist her right ankle and fall. Reports she caught herself with her hands. Pt complains of right ankle pain and swelling. Denies hitting her head or any other complaints. Swelling noted to right ankle/foot. Pedal pulse present during triage.

## 2022-12-11 NOTE — ED PROVIDER NOTES
This is an 81-year-old female who comes emergency room after she tripped causing her to twist her right ankle and fall. Patient denies hitting her head stating that she caught herself with her bilateral hands. Complains of right ankle pain and swelling and difficulty weightbearing. Patient was helped off the ground due to the increased pain in her right ankle and foot. Denies any chest pain, shortness of breath, dizziness, syncopal event prior to the fall, nausea or vomiting, fevers or chills. Has not taken any medication prior to arrival for her symptoms. There are no further complaints at this time. Reuben Monahan MD  Past Medical History:  No date: Adverse effect of anesthesia  No date: Anesthesia      Comment:  required prolonged warming , moderate amount of                anesthesia  No date: H/O psoriasis  No date: Hyperlipidemia  No date: Hypertension  2012: Stroke [facility])      Comment:  TIA  Past Surgical History:  No date: HX CATARACT REMOVAL  No date: HX HYSTERECTOMY  No date: HX OTHER SURGICAL      Comment:  removed skin cancer from chest  No date: HX SEPTOPLASTY      Comment:  repaired           Past Medical History:   Diagnosis Date    H/O psoriasis     Hyperlipidemia     Hypertension     Stroke ([facility] [address] 2012    TIA       Past Surgical History:   Procedure Laterality Date    HX CATARACT REMOVAL      HX HYSTERECTOMY      [place] NASAL SURG PROC UNLISTED           Family History:   Problem Relation Age of Onset    Other Mother          from surgical complications    Hypertension Mother     Hypertension Father     Stroke Father        Social History     Socioeconomic History    Marital status:      Spouse name: Not on file    Number of children: Not on file    Years of education: Not on file    Highest education level: Not on file   Occupational History    Not on file   Tobacco Use    Smoking status: Never    Smokeless tobacco: Never   Substance and Sexual Activity    Alcohol use:  Yes Alcohol/week: 5.8 standard drinks     Types: 7 Standard drinks or equivalent per week     Comment: red wine, one glass a night    Drug use: No    Sexual activity: Not on file   Other Topics Concern    Not on file   Social History Narrative    Not on file     Social Determinants of Health     Financial Resource Strain: Not on file   Food Insecurity: Not on file   Transportation Needs: Not on file   Physical Activity: Not on file   Stress: Not on file   Social Connections: Not on file   Intimate Partner Violence: Not on file   Housing Stability: Not on file         ALLERGIES: Codeine    Review of Systems   Constitutional:  Negative for appetite change, chills, diaphoresis, fatigue and fever. HENT:  Negative for congestion, sore throat and trouble swallowing. Eyes:  Negative for photophobia, redness and visual disturbance. Respiratory:  Negative for chest tightness, shortness of breath and wheezing. Cardiovascular:  Negative for chest pain and palpitations. Gastrointestinal:  Negative for abdominal distention, abdominal pain, nausea and vomiting. Endocrine: Negative. Genitourinary:  Negative for difficulty urinating. Musculoskeletal:  Positive for arthralgias (right ankle) and joint swelling (right ankle). Negative for back pain, neck pain and neck stiffness. Skin:  Negative for color change, pallor, rash and wound. Allergic/Immunologic: Negative. Neurological:  Negative for dizziness, speech difficulty, weakness and headaches. Hematological:  Does not bruise/bleed easily. Psychiatric/Behavioral:  Negative for behavioral problems. The patient is not nervous/anxious. Vitals:    12/11/22 1319   BP: (!) 143/85   Pulse: 78   Resp: 15   Temp: 98.7 °F (37.1 °C)   SpO2: 98%   Weight: 52.6 kg (116 lb)   Height: 5' 4\" (1.626 m)            Physical Exam  Vitals and nursing note reviewed. Constitutional:       General: She is not in acute distress. Appearance: Normal appearance.  She is well-developed. She is not ill-appearing. HENT:      Head: Normocephalic and atraumatic. Right Ear: External ear normal.      Left Ear: External ear normal.      Nose: Nose normal. No congestion. Mouth/Throat:      Mouth: Mucous membranes are moist.   Eyes:      General:         Right eye: No discharge. Left eye: No discharge. Conjunctiva/sclera: Conjunctivae normal.      Pupils: Pupils are equal, round, and reactive to light. Neck:      Vascular: No JVD. Trachea: No tracheal deviation. Cardiovascular:      Rate and Rhythm: Normal rate and regular rhythm. Pulses: Normal pulses. Heart sounds: Normal heart sounds. No murmur heard. No gallop. Pulmonary:      Effort: Pulmonary effort is normal. No respiratory distress. Breath sounds: Normal breath sounds. Chest:      Chest wall: No tenderness. Abdominal:      General: Bowel sounds are normal. There is no distension. Palpations: Abdomen is soft. Tenderness: There is no abdominal tenderness. There is no guarding or rebound. Genitourinary:     Comments: Negative    Musculoskeletal:         General: Swelling, tenderness and signs of injury present. Cervical back: Normal range of motion and neck supple. No tenderness. Comments: Right ankle with pain and swelling, positive palpable pulses       Skin:     General: Skin is warm and dry. Capillary Refill: Capillary refill takes less than 2 seconds. Coloration: Skin is not pale. Findings: No erythema or rash. Neurological:      General: No focal deficit present. Mental Status: She is alert and oriented to person, place, and time. Motor: No weakness. Coordination: Coordination normal.   Psychiatric:         Mood and Affect: Mood normal.         Behavior: Behavior normal.         Thought Content:  Thought content normal.         Judgment: Judgment normal.        MDM  Number of Diagnoses or Management Options  Acute right ankle pain: new and requires workup  Trimalleolar fracture of ankle, closed, right, initial encounter: new and requires workup  Diagnosis management comments: Differential diagnosis includes bimalleolar fracture, trimalleolar fracture, ankle sprain and others. After physical assessment and review of imaging, patient was placed in a splint and discharged home and will follow up with PCP as well as orthopedics. Return to the emergency room with worsening symptoms. Patient in agreement with plan of care. Amount and/or Complexity of Data Reviewed  Tests in the radiology section of CPT®: ordered and reviewed         1415: Splint applied by staff and evaluated by Delbert Cervantes NP. Neurovascular status intact post splint application. Desired position maintained. 1422:   Pt has been reexamined. Pt has no new complaints, changes or physical findings. Care plan outlined and precautions discussed. All available results were reviewed with pt. All medications were reviewed with pt. All of pt's questions and concerns were addressed. Pt agrees to F/U as instructed and agrees to return to ED upon further deterioration. Pt is ready to go home. Delbert Cervantes NP    Please note that this dictation was completed with [employer], the computer voice recognition software. Quite often unanticipated grammatical, syntax, homophones, and other interpretive errors are inadvertently transcribed by the computer software. Please disregard these errors. Please excuse any errors that have escaped final proofreading. Thank you.         Procedures

## 2022-12-15 ENCOUNTER — HOSPITAL ENCOUNTER (OUTPATIENT)
Dept: PREADMISSION TESTING | Age: 82
Discharge: HOME | End: 2022-12-15
Payer: MEDICARE

## 2022-12-15 VITALS
SYSTOLIC BLOOD PRESSURE: 139 MMHG | HEART RATE: 86 BPM | OXYGEN SATURATION: 100 % | BODY MASS INDEX: 19.63 KG/M2 | DIASTOLIC BLOOD PRESSURE: 61 MMHG | WEIGHT: 115 LBS | HEIGHT: 64 IN | TEMPERATURE: 97.1 F | RESPIRATION RATE: 16 BRPM

## 2022-12-15 LAB
ANION GAP SERPL CALC-SCNC: 5 MMOL/L (ref 5–15)
BASOPHILS # BLD: 0 K/UL (ref 0–0.1)
BASOPHILS NFR BLD: 0 % (ref 0–1)
BUN SERPL-MCNC: 26 MG/DL (ref 6–20)
BUN/CREAT SERPL: 31 (ref 12–20)
CALCIUM SERPL-MCNC: 9.2 MG/DL (ref 8.5–10.1)
CHLORIDE SERPL-SCNC: 103 MMOL/L (ref 97–108)
CO2 SERPL-SCNC: 29 MMOL/L (ref 21–32)
CREAT SERPL-MCNC: 0.84 MG/DL (ref 0.55–1.02)
DIFFERENTIAL METHOD BLD: ABNORMAL
EOSINOPHIL # BLD: 0.1 K/UL (ref 0–0.4)
EOSINOPHIL NFR BLD: 1 % (ref 0–7)
ERYTHROCYTE [DISTWIDTH] IN BLOOD BY AUTOMATED COUNT: 14.8 % (ref 11.5–14.5)
GLUCOSE SERPL-MCNC: 109 MG/DL (ref 65–100)
HCT VFR BLD AUTO: 41.3 % (ref 35–47)
HGB BLD-MCNC: 13.7 G/DL (ref 11.5–16)
IMM GRANULOCYTES # BLD AUTO: 0.1 K/UL (ref 0–0.04)
IMM GRANULOCYTES NFR BLD AUTO: 1 % (ref 0–0.5)
INR PPP: 1 (ref 0.9–1.1)
LYMPHOCYTES # BLD: 0.8 K/UL (ref 0.8–3.5)
LYMPHOCYTES NFR BLD: 9 % (ref 12–49)
MCH RBC QN AUTO: 34.3 PG (ref 26–34)
MCHC RBC AUTO-ENTMCNC: 33.2 G/DL (ref 30–36.5)
MCV RBC AUTO: 103.5 FL (ref 80–99)
MONOCYTES # BLD: 0.5 K/UL (ref 0–1)
MONOCYTES NFR BLD: 6 % (ref 5–13)
NEUTS SEG # BLD: 7.4 K/UL (ref 1.8–8)
NEUTS SEG NFR BLD: 83 % (ref 32–75)
NRBC # BLD: 0 K/UL (ref 0–0.01)
NRBC BLD-RTO: 0 PER 100 WBC
PLATELET # BLD AUTO: 262 K/UL (ref 150–400)
PMV BLD AUTO: 8.9 FL (ref 8.9–12.9)
POTASSIUM SERPL-SCNC: 5 MMOL/L (ref 3.5–5.1)
PROTHROMBIN TIME: 10.1 SEC (ref 9–11.1)
RBC # BLD AUTO: 3.99 M/UL (ref 3.8–5.2)
SODIUM SERPL-SCNC: 137 MMOL/L (ref 136–145)
WBC # BLD AUTO: 8.9 K/UL (ref 3.6–11)

## 2022-12-15 PROCEDURE — 93005 ELECTROCARDIOGRAM TRACING: CPT

## 2022-12-15 PROCEDURE — 85610 PROTHROMBIN TIME: CPT

## 2022-12-15 PROCEDURE — 80048 BASIC METABOLIC PNL TOTAL CA: CPT

## 2022-12-15 PROCEDURE — 36415 COLL VENOUS BLD VENIPUNCTURE: CPT

## 2022-12-15 PROCEDURE — 85025 COMPLETE CBC W/AUTO DIFF WBC: CPT

## 2022-12-15 RX ORDER — ACETAMINOPHEN 500 MG
500 TABLET ORAL AS NEEDED
COMMUNITY

## 2022-12-15 RX ORDER — VALSARTAN 80 MG/1
80 TABLET ORAL EVERY MORNING
COMMUNITY

## 2022-12-15 RX ORDER — FOLIC ACID 1 MG/1
1 TABLET ORAL DAILY
COMMUNITY

## 2022-12-15 RX ORDER — METHOTREXATE 2.5 MG/1
7.5 TABLET ORAL
COMMUNITY

## 2022-12-15 NOTE — PERIOP NOTES
Pt to be admitted to the hospital on Monday 12/19/22 per Dr. Frank Turner to prevent any injuries before surgery. Pt states she will talk to her PCP tomorrow about aspirin dose and when to stop it, Ekg done with lab work in [place]. Instructions modified due to hospitalization prior to surgery.

## 2022-12-15 NOTE — PERIOP NOTES
[address] [address] [facility]   MAIN OR                                  [phone]   MAIN PRE OP                          [phone]                                                                                AMBULATORY PRE OP          [phone]  PRE-ADMISSION TESTING    [phone]   Surgery Date:  12/22/22       Is surgery arrival time given by surgeon? YES  NO  If NO, [address] [facility] staff will call you between 3 and 7pm the day before your surgery with your arrival time. (If your surgery is on a Monday, we will call you the Friday before.)    Call [phone] after 7pm Monday-Friday if you did not receive this call. INSTRUCTIONS BEFORE YOUR SURGERY   When You  Arrive Arrive at the 2nd [address] [facility] on the day of your surgery  Have your insurance card, photo ID, and any copayment (if needed)   Food   and   Drink NO food or drink after midnight the night before surgery    This means NO water, gum, mints, coffee, juice, etc.  No alcohol (beer, wine, liquor) 24 hours before and after surgery   Medications to   TAKE   Morning of Surgery MEDICATIONS TO TAKE THE MORNING OF SURGERY WITH A SIP OF WATER:   Clonidine  Take valsartan evening dose at 5 pm 12/21/22   Medications  To  STOP      7 days before surgery Non-Steroidal anti-inflammatory Drugs (NSAID's): for example, Ibuprofen (Advil, Motrin), Naproxen (Aleve)  Aspirin, if taking for pain   Herbal supplements, vitamins, and fish oil  Other:  (Pain medications not listed above, including Tylenol may be taken)   Blood  Thinners If you take  Aspirin, Plavix, Coumadin, or any blood-thinning or anti-blood clot medicine, talk to the doctor who prescribed the medications for pre-operative instructions.    Bathing Clothing  Jewelry  Valuables     If you shower the morning of surgery, please do not apply anything to your skin (lotions, powders, deodorant, or makeup, especially yony)  Follow Chlorhex[employer] body wash instructions provided to you during PAT appointment. Begin 3 days prior to surgery. Do not shave or trim anywhere 24 hours before surgery  Wear your hair loose or down; no pony-tails, buns, or metal hair clips  Wear loose, comfortable, clean clothes  Wear glasses instead of contacts  Leave money, valuables, and jewelry, including body piercings, at home  If you were given an [employer], bring it on day of surgery. Going Home - or Spending the Night SAME-DAY SURGERY: You must have a responsible adult drive you home and stay with you 24 hours after surgery  ADMITS: If your doctor is keeping you in the hospital after surgery, leave personal belongings/luggage in your car until you have a hospital room number. Hospital discharge time is 12 noon  Drivers must be here before 12 noon unless you are told differently   Special Instructions Please bring covid vaccine card day of surgery       Follow all instructions so your surgery wont be cancelled. Please, be on time. If a situation occurs and you are delayed the day of surgery, call [phone]    If your physical condition changes (like a fever, cold, flu, etc.) call your surgeon. Home medication(s) reviewed and verified via     LIST   VERBAL   during PAT appointment. The patient was contacted by     IN-PERSON  The patient verbalizes understanding of all instructions and     DOES NOT   need reinforcement.

## 2022-12-16 LAB
ATRIAL RATE: 75 BPM
BACTERIA SPEC CULT: NORMAL
BACTERIA SPEC CULT: NORMAL
CALCULATED P AXIS, ECG09: 62 DEGREES
CALCULATED R AXIS, ECG10: -23 DEGREES
CALCULATED T AXIS, ECG11: 87 DEGREES
DIAGNOSIS, 93000: NORMAL
P-R INTERVAL, ECG05: 168 MS
Q-T INTERVAL, ECG07: 400 MS
QRS DURATION, ECG06: 74 MS
QTC CALCULATION (BEZET), ECG08: 438 MS
SERVICE CMNT-IMP: NORMAL
VENTRICULAR RATE, ECG03: 72 BPM

## 2022-12-19 ENCOUNTER — APPOINTMENT (OUTPATIENT)
Dept: GENERAL RADIOLOGY | Age: 82
End: 2022-12-19
Attending: PHYSICIAN ASSISTANT
Payer: MEDICARE

## 2022-12-19 ENCOUNTER — HOSPITAL ENCOUNTER (INPATIENT)
Age: 82
LOS: 4 days | Discharge: NURSING, REHAB OR LONG-TERM CARE | End: 2022-12-23
Attending: STUDENT IN AN ORGANIZED HEALTH CARE EDUCATION/TRAINING PROGRAM | Admitting: INTERNAL MEDICINE
Payer: MEDICARE

## 2022-12-19 DIAGNOSIS — S82.891D CLOSED FRACTURE OF RIGHT ANKLE WITH ROUTINE HEALING, SUBSEQUENT ENCOUNTER: ICD-10-CM

## 2022-12-19 DIAGNOSIS — S82.851A CLOSED TRIMALLEOLAR FRACTURE OF RIGHT ANKLE, INITIAL ENCOUNTER: ICD-10-CM

## 2022-12-19 DIAGNOSIS — R26.2 AMBULATORY DYSFUNCTION: Primary | ICD-10-CM

## 2022-12-19 PROBLEM — S82.899A FX ANKLE: Status: ACTIVE | Noted: 2022-12-19

## 2022-12-19 PROCEDURE — 74011250637 HC RX REV CODE- 250/637: Performed by: INTERNAL MEDICINE

## 2022-12-19 PROCEDURE — 74011250636 HC RX REV CODE- 250/636: Performed by: INTERNAL MEDICINE

## 2022-12-19 PROCEDURE — 65270000029 HC RM PRIVATE

## 2022-12-19 PROCEDURE — 73610 X-RAY EXAM OF ANKLE: CPT

## 2022-12-19 PROCEDURE — 99285 EMERGENCY DEPT VISIT HI MDM: CPT

## 2022-12-19 PROCEDURE — 74011000250 HC RX REV CODE- 250: Performed by: INTERNAL MEDICINE

## 2022-12-19 RX ORDER — VALSARTAN 80 MG/1
80 TABLET ORAL EVERY MORNING
Status: DISCONTINUED | OUTPATIENT
Start: 2022-12-20 | End: 2022-12-23 | Stop reason: HOSPADM

## 2022-12-19 RX ORDER — SODIUM CHLORIDE 0.9 % (FLUSH) 0.9 %
5-40 SYRINGE (ML) INJECTION AS NEEDED
Status: DISCONTINUED | OUTPATIENT
Start: 2022-12-19 | End: 2022-12-23 | Stop reason: HOSPADM

## 2022-12-19 RX ORDER — LANOLIN ALCOHOL/MO/W.PET/CERES
3 CREAM (GRAM) TOPICAL
Status: DISCONTINUED | OUTPATIENT
Start: 2022-12-19 | End: 2022-12-23 | Stop reason: HOSPADM

## 2022-12-19 RX ORDER — SODIUM CHLORIDE 0.9 % (FLUSH) 0.9 %
5-40 SYRINGE (ML) INJECTION EVERY 8 HOURS
Status: DISCONTINUED | OUTPATIENT
Start: 2022-12-19 | End: 2022-12-23 | Stop reason: HOSPADM

## 2022-12-19 RX ORDER — SODIUM CHLORIDE 9 MG/ML
75 INJECTION, SOLUTION INTRAVENOUS CONTINUOUS
Status: DISCONTINUED | OUTPATIENT
Start: 2022-12-19 | End: 2022-12-23 | Stop reason: HOSPADM

## 2022-12-19 RX ORDER — ONDANSETRON 4 MG/1
4 TABLET, ORALLY DISINTEGRATING ORAL
Status: DISCONTINUED | OUTPATIENT
Start: 2022-12-19 | End: 2022-12-23 | Stop reason: HOSPADM

## 2022-12-19 RX ORDER — ACETAMINOPHEN 650 MG/1
650 SUPPOSITORY RECTAL
Status: DISCONTINUED | OUTPATIENT
Start: 2022-12-19 | End: 2022-12-23 | Stop reason: HOSPADM

## 2022-12-19 RX ORDER — CLONIDINE HYDROCHLORIDE 0.1 MG/1
0.1 TABLET ORAL 2 TIMES DAILY
Status: DISCONTINUED | OUTPATIENT
Start: 2022-12-19 | End: 2022-12-23 | Stop reason: HOSPADM

## 2022-12-19 RX ORDER — ONDANSETRON 2 MG/ML
4 INJECTION INTRAMUSCULAR; INTRAVENOUS
Status: DISCONTINUED | OUTPATIENT
Start: 2022-12-19 | End: 2022-12-23 | Stop reason: HOSPADM

## 2022-12-19 RX ORDER — ACETAMINOPHEN 500 MG
1000 TABLET ORAL
Status: DISCONTINUED | OUTPATIENT
Start: 2022-12-19 | End: 2022-12-23 | Stop reason: HOSPADM

## 2022-12-19 RX ORDER — ACETAMINOPHEN 325 MG/1
650 TABLET ORAL
Status: DISCONTINUED | OUTPATIENT
Start: 2022-12-19 | End: 2022-12-23 | Stop reason: HOSPADM

## 2022-12-19 RX ORDER — VALSARTAN 40 MG/1
40 TABLET ORAL EVERY EVENING
Status: DISCONTINUED | OUTPATIENT
Start: 2022-12-19 | End: 2022-12-23 | Stop reason: HOSPADM

## 2022-12-19 RX ORDER — ENOXAPARIN SODIUM 100 MG/ML
40 INJECTION SUBCUTANEOUS DAILY
Status: DISCONTINUED | OUTPATIENT
Start: 2022-12-20 | End: 2022-12-23

## 2022-12-19 RX ORDER — POLYETHYLENE GLYCOL 3350 17 G/17G
17 POWDER, FOR SOLUTION ORAL DAILY PRN
Status: DISCONTINUED | OUTPATIENT
Start: 2022-12-19 | End: 2022-12-23 | Stop reason: HOSPADM

## 2022-12-19 RX ADMIN — VALSARTAN 40 MG: 40 TABLET, FILM COATED ORAL at 20:07

## 2022-12-19 RX ADMIN — CLONIDINE HYDROCHLORIDE 0.1 MG: 0.1 TABLET ORAL at 20:07

## 2022-12-19 RX ADMIN — SODIUM CHLORIDE 75 ML/HR: 9 INJECTION, SOLUTION INTRAVENOUS at 23:29

## 2022-12-19 RX ADMIN — ACETAMINOPHEN 1000 MG: 500 TABLET ORAL at 23:36

## 2022-12-19 RX ADMIN — SODIUM CHLORIDE, PRESERVATIVE FREE 10 ML: 5 INJECTION INTRAVENOUS at 23:28

## 2022-12-19 NOTE — ED PROVIDER NOTES
Patient was seen on the  of this month after she had an ankle injury, had acute distal fibular, medial malleolus, and posterior malleolus fractures identified on x-ray, was splinted and followed up in the office, there was some movement according to the patient of the fracture fragments and patient is not mobile at all at home, requiring her  for full assist.  Patient states that Dr. Francisco Vuong suggested that she be admitted today pending surgery which is scheduled on the  although I am not sure if there is a plan to move it up.         Past Medical History:   Diagnosis Date    Adverse effect of anesthesia     Anesthesia     required prolonged warming , moderate amount of anesthesia    H/O psoriasis     Hyperlipidemia     Hypertension     Stroke ([facility] [address] 2012    TIA       Past Surgical History:   Procedure Laterality Date    HX CATARACT REMOVAL      HX HYSTERECTOMY      HX HYSTERECTOMY      HX OTHER SURGICAL      removed skin cancer from chest    HX SEPTOPLASTY      repaired    [place] NASAL SURG PROC UNLISTED           Family History:   Problem Relation Age of Onset    Other Mother          from surgical complications    Hypertension Mother     Hypertension Father     Stroke Father        Social History     Socioeconomic History    Marital status:      Spouse name: Not on file    Number of children: Not on file    Years of education: Not on file    Highest education level: Not on file   Occupational History    Not on file   Tobacco Use    Smoking status: Never    Smokeless tobacco: Never   Vaping Use    Vaping Use: Never used   Substance and Sexual Activity    Alcohol use: Not Currently     Alcohol/week: 3.0 standard drinks     Types: 2 Glasses of wine, 1 Standard drinks or equivalent per week     Comment: red wine, one glass a night    Drug use: No    Sexual activity: Not on file   Other Topics Concern    Not on file   Social History Narrative    Not on file     Social Determinants of Health     Financial Resource Strain: Not on file   Food Insecurity: Not on file   Transportation Needs: Not on file   Physical Activity: Not on file   Stress: Not on file   Social Connections: Not on file   Intimate Partner Violence: Not on file   Housing Stability: Not on file         ALLERGIES: Codeine    Review of Systems   Constitutional:  Negative for chills and fever. Eyes:  Negative for photophobia. Respiratory:  Negative for shortness of breath. Cardiovascular:  Negative for chest pain. Gastrointestinal:  Negative for abdominal pain, nausea and vomiting. Genitourinary:  Negative for dysuria. Musculoskeletal:  Positive for arthralgias. Negative for back pain. Neurological:  Negative for headaches. Psychiatric/Behavioral:  Negative for confusion. All other systems reviewed and are negative. Vitals:    12/19/22 1333   BP: 124/70   Pulse: 84   Resp: 16   Temp: 97.5 °F (36.4 °C)   SpO2: 98%   Weight: 52.2 kg (115 lb)   Height: 5' 4\" (1.626 m)            Physical Exam  Constitutional:       General: She is not in acute distress. Appearance: She is not ill-appearing or toxic-appearing. HENT:      Head: Normocephalic. Nose: Nose normal.      Mouth/Throat:      Mouth: Mucous membranes are dry. Eyes:      Pupils: Pupils are equal, round, and reactive to light. Cardiovascular:      Pulses: Normal pulses. Pulmonary:      Effort: Pulmonary effort is normal. No respiratory distress. Breath sounds: No stridor. Abdominal:      General: Abdomen is flat. There is no distension. Musculoskeletal:         General: Normal range of motion. Cervical back: Normal range of motion. Comments: Right distal lower extremity is in a splint, normal sensation and capillary refill    Skin:     General: Skin is warm. Capillary Refill: Capillary refill takes less than 2 seconds. Neurological:      General: No focal deficit present.       Mental Status: She is alert and oriented to person, place, and time. Psychiatric:         Mood and Affect: Mood normal.         Behavior: Behavior normal.        MDM         Procedures    Perfect Serve Consult for Admission  4:09 PM    ED Room Number: J74/P25  Patient Name and age:  Sunil Salcedo 81 y.o.  female  Working Diagnosis:   1. Ambulatory dysfunction    2. Closed fracture of right ankle with routine healing, subsequent encounter        COVID-19 Suspicion:  no  Sepsis present:  no  Reassessment needed: no  Code Status:  Full Code  Readmission: no  Isolation Requirements:  no  Recommended Level of Care:  med/surg  Department:[facility] ED - [phone]  Other:     Patient was referred by orthopedic surgery for admission. They are concerned that she has an unstable fracture which may worsen or cause neurovascular compromise if she continues to try to care for this over the neck several days until her scheduled surgery. Requesting hospitalist admission.

## 2022-12-19 NOTE — ED TRIAGE NOTES
Patient sent by surgeon for [facility] and surgery. Patient has surgery scheduled on the 22nd but will be doing it early due to patients mobility concerns.

## 2022-12-19 NOTE — H&P
Hospitalist Admission Note    NAME: Dora Pina   :  1940   MRN:  [id number]     Date/Time:  2022 5:07 PM    Patient PCP: Stas Spence MD  ____________________________________________________________________  Chief complaint on presentation:  Right ankle pain and difficulty ambulating with recent history of right ankle fracture. History of present illness:  Patient is a very pleasant 31-year-old  female with past medical history significant for hypertension, rheumatoid arthritis for which the patient has been on methotrexate weekly, dyslipidemia who happened to be in her usual state of health until last  when she fell while in a local store when she tripped over some obstacle, resulting in her right trimalleolar fracture for which she was being seen by orthopedic as an outpatient and no surgery is planned for upcoming Thursday, presented to the emergency room on advice of the orthopedic because of the worsening pain and difficulty ambulating requiring a full support from her . On arrival to the emergency room, patient was noted to be hypertensive with initial blood pressure of 169/80, afebrile with a temp of 98.2, without any tachycardia or tachypnea with an O2 sat of 100% on room air. Labs were drawn and not available at this time. An x-ray of the right ankle was done which showed unchanged trimalleolar right ankle fracture. Patient is being admitted for adequate pain control and to be reevaluated by the orthopedic for surgery.   Past Medical History:   Diagnosis Date    Adverse effect of anesthesia     Anesthesia     required prolonged warming , moderate amount of anesthesia    H/O psoriasis     Hyperlipidemia     Hypertension     Stroke ([facility] [address] 2012    TIA      Past Surgical History:   Procedure Laterality Date    HX CATARACT REMOVAL      HX HYSTERECTOMY      HX HYSTERECTOMY      HX OTHER SURGICAL      removed skin cancer from chest    HX SEPTOPLASTY repaired    [place] NASAL SURG PROC UNLISTED       Social History     Tobacco Use    Smoking status: Never    Smokeless tobacco: Never   Substance Use Topics    Alcohol use: Not Currently     Alcohol/week: 3.0 standard drinks     Types: 2 Glasses of wine, 1 Standard drinks or equivalent per week     Comment: red wine, one glass a night      Family History   Problem Relation Age of Onset    Other Mother          from surgical complications    Hypertension Mother     Hypertension Father     Stroke Father        Allergies   Allergen Reactions    Codeine Other (comments)     Hallucinations and dry heaves. Prior to Admission medications    Medication Sig Start Date End Date Taking? Authorizing Provider   BIOTIN PO Take 1,000 mcg by mouth daily. Provider, Historical   docosahexaenoic acid/epa (FISH OIL PO) Take 1,200 mg by mouth daily. Provider, Historical   folic acid (FOLVITE) 1 mg tablet Take 1 mg by mouth daily. Provider, Historical   ferrous sulfate (IRON PO) Take 1 Tablet by mouth every Monday and Friday. Provider, Historical   methotrexate (RHEUMATREX) 2.5 mg tablet Take 7.5 mg by mouth every seven (7) days. Provider, Historical   valsartan (DIOVAN) 80 mg tablet Take 80 mg by mouth Every morning. Provider, Historical   acetaminophen (Tylenol Extra Strength) 500 mg tablet Take 500 mg by mouth as needed for Pain. Provider, Historical   cyanocobalamin, vitamin B-12, (VITAMIN B-12 PO) Take 1 Tablet by mouth every Monday and Friday. Provider, Historical   l.acidoph-B.lactis-B.longum (FLORAJEN3) 460 mg (7.5-6- 1.5 bill. cell) cap cap Take 1 Cap by mouth Daily (before breakfast). 18   Ramonita Poe MD   cholecalciferol (VITAMIN D3) (2,000 UNITS /50 MCG) cap capsule Take 2,000 Units by mouth daily. Provider, Historical   aspirin 81 mg chewable tablet Take 81 mg by mouth daily. 12   Satnam Sher MD   atorvastatin (LIPITOR) 10 mg tablet Take 1 Tab by mouth daily.  12 Tracee Srivastava MD   cloNIDine (CATAPRES) 0.1 mg tablet Take 0.1 mg by mouth two (2) times a day. Maciel Persaud MD   valsartan (DIOVAN) 40 mg tablet Take 40 mg by mouth every evening. Maciel Persaud MD   omega-3 fatty acids-vitamin e (FISH OIL) 1,000 mg Cap Take 1 Cap by mouth.       Maciel Persaud MD     REVIEW OF SYSTEMS:  See HPI for details  General: negative for fever, chills, sweats, weakness, weight loss  Eyes: negative for blurred vision, eye pain, loss of vision, diplopia  Ear Nose and Throat: negative for rhinorrhea, pharyngitis, otalgia, tinnitus, speech or swallowing difficulties  Respiratory:  negative for pleuritic pain, cough, sputum production, wheezing, SOB, LEONARDO  Cardiology:  negative for chest pain, palpitations, orthopnea, PND, edema, syncope   Gastrointestinal: negative for abdominal pain, N/V, dysphagia, change in bowel habits, bleeding  Genitourinary: negative for frequency, urgency, dysuria, hematuria, incontinence  Muskuloskeletal : Positive for right ankle pain and swelling  Hematology: negative for easy bruising, bleeding, lymphadenopathy  Dermatological: negative for rash, ulceration, mole change, new lesion  Endocrine: negative for hot flashes or polydipsia  Neurological: negative for headache, dizziness, confusion, focal weakness, paresthesia, memory loss, gait disturbance  Psychological: negative for anxiety, depression, agitation    Visit Vitals  /70 (BP 1 Location: Left upper arm, BP Patient Position: At rest)   Pulse 84   Temp 97.5 °F (36.4 °C)   Resp 16   Ht 5' 4\" (1.626 m)   Wt 52.2 kg (115 lb)   SpO2 98%   BMI 19.74 kg/m²   Physical Exam:  Gen: Well-developed, well-nourished, in no acute distress  HEENT:  Pink conjunctivae, PERRL, hearing intact to voice, moist mucous membranes  Neck: Supple, without masses, thyroid non-tender  Resp: No accessory muscle use, clear breath sounds without wheezes rales or rhonchi  Card: No murmurs, normal S1, S2 without thrills, bruits or peripheral edema  Abd:  Soft, non-tender, non-distended, normoactive bowel sounds are present, no palpable organomegaly and no detectable hernias  Lymph:  No cervical or inguinal adenopathy  Musc: Right lower extremity splinted, with normal sensation and capillary refill. Skin: No rashes or ulcers, skin turgor is good  Neuro:  Cranial nerves are grossly intact, no focal motor weakness, follows commands appropriately  Psych:  Good insight, oriented to person, place and time, alert  12/19/22 1852  XR ANKLE RT MIN 3 V   Performed: 12/19/22 1818  Final        Impression: Unchanged trimalleolar ankle fracture. .       Assessment:  Right ankle pain and swelling with recent trimalleolar fracture; awaiting for surgery. History of hypertension; controlled   History of dyslipidemia. History of TIA. History of psoriasis. Plans:  Patient being admitted for adequate pain control and to be reevaluated by orthopedic surgery for scheduling ORIF. Pain control with IV morphine and Tylenol as needed. Resuming patient home medication including valsartan, clonidine. Start gentle hydration with normal saline. PT/OT with non-weight bearing as tolerated. Get labs in the morning including CBC, BMP, PT/INR. DVT prophylaxis with Lovenox.   Inpatient orthopedic consult, Dr. Yemi Kearns aware.          _____________________________________________________

## 2022-12-20 PROBLEM — S82.851A TRIMALLEOLAR FRACTURE OF RIGHT ANKLE: Status: ACTIVE | Noted: 2022-12-20

## 2022-12-20 LAB
ALBUMIN SERPL-MCNC: 2.6 G/DL (ref 3.5–5)
ALBUMIN/GLOB SERPL: 1 {RATIO} (ref 1.1–2.2)
ALP SERPL-CCNC: 96 U/L (ref 45–117)
ALT SERPL-CCNC: 25 U/L (ref 12–78)
ANION GAP SERPL CALC-SCNC: 6 MMOL/L (ref 5–15)
APPEARANCE UR: CLEAR
AST SERPL-CCNC: 26 U/L (ref 15–37)
BACTERIA URNS QL MICRO: NEGATIVE /HPF
BASOPHILS # BLD: 0 K/UL (ref 0–0.1)
BASOPHILS NFR BLD: 0 % (ref 0–1)
BILIRUB SERPL-MCNC: 0.6 MG/DL (ref 0.2–1)
BILIRUB UR QL: NEGATIVE
BUN SERPL-MCNC: 22 MG/DL (ref 6–20)
BUN/CREAT SERPL: 31 (ref 12–20)
CALCIUM SERPL-MCNC: 8.8 MG/DL (ref 8.5–10.1)
CHLORIDE SERPL-SCNC: 105 MMOL/L (ref 97–108)
CO2 SERPL-SCNC: 28 MMOL/L (ref 21–32)
COLOR UR: NORMAL
CREAT SERPL-MCNC: 0.72 MG/DL (ref 0.55–1.02)
DIFFERENTIAL METHOD BLD: ABNORMAL
EOSINOPHIL # BLD: 0.1 K/UL (ref 0–0.4)
EOSINOPHIL NFR BLD: 1 % (ref 0–7)
EPITH CASTS URNS QL MICRO: NORMAL /LPF
ERYTHROCYTE [DISTWIDTH] IN BLOOD BY AUTOMATED COUNT: 15 % (ref 11.5–14.5)
GLOBULIN SER CALC-MCNC: 2.5 G/DL (ref 2–4)
GLUCOSE SERPL-MCNC: 109 MG/DL (ref 65–100)
GLUCOSE UR STRIP.AUTO-MCNC: NEGATIVE MG/DL
HCT VFR BLD AUTO: 34.9 % (ref 35–47)
HGB BLD-MCNC: 11.7 G/DL (ref 11.5–16)
HGB UR QL STRIP: NEGATIVE
HYALINE CASTS URNS QL MICRO: NORMAL /LPF (ref 0–2)
IMM GRANULOCYTES # BLD AUTO: 0.1 K/UL (ref 0–0.04)
IMM GRANULOCYTES NFR BLD AUTO: 1 % (ref 0–0.5)
INR PPP: 1 (ref 0.9–1.1)
KETONES UR QL STRIP.AUTO: NEGATIVE MG/DL
LEUKOCYTE ESTERASE UR QL STRIP.AUTO: NEGATIVE
LYMPHOCYTES # BLD: 0.9 K/UL (ref 0.8–3.5)
LYMPHOCYTES NFR BLD: 13 % (ref 12–49)
MAGNESIUM SERPL-MCNC: 2.1 MG/DL (ref 1.6–2.4)
MCH RBC QN AUTO: 34.3 PG (ref 26–34)
MCHC RBC AUTO-ENTMCNC: 33.5 G/DL (ref 30–36.5)
MCV RBC AUTO: 102.3 FL (ref 80–99)
MONOCYTES # BLD: 0.7 K/UL (ref 0–1)
MONOCYTES NFR BLD: 10 % (ref 5–13)
NEUTS SEG # BLD: 5.2 K/UL (ref 1.8–8)
NEUTS SEG NFR BLD: 75 % (ref 32–75)
NITRITE UR QL STRIP.AUTO: NEGATIVE
NRBC # BLD: 0 K/UL (ref 0–0.01)
NRBC BLD-RTO: 0 PER 100 WBC
PH UR STRIP: 6.5 [PH] (ref 5–8)
PLATELET # BLD AUTO: 188 K/UL (ref 150–400)
PMV BLD AUTO: 8.6 FL (ref 8.9–12.9)
POTASSIUM SERPL-SCNC: 4.5 MMOL/L (ref 3.5–5.1)
PROT SERPL-MCNC: 5.1 G/DL (ref 6.4–8.2)
PROT UR STRIP-MCNC: NEGATIVE MG/DL
PROTHROMBIN TIME: 10.6 SEC (ref 9–11.1)
RBC # BLD AUTO: 3.41 M/UL (ref 3.8–5.2)
RBC #/AREA URNS HPF: NORMAL /HPF (ref 0–5)
SODIUM SERPL-SCNC: 139 MMOL/L (ref 136–145)
SP GR UR REFRACTOMETRY: 1.01 (ref 1–1.03)
UA: UC IF INDICATED,UAUC: NORMAL
UROBILINOGEN UR QL STRIP.AUTO: 0.2 EU/DL (ref 0.2–1)
WBC # BLD AUTO: 7 K/UL (ref 3.6–11)
WBC URNS QL MICRO: NORMAL /HPF (ref 0–4)

## 2022-12-20 PROCEDURE — 85025 COMPLETE CBC W/AUTO DIFF WBC: CPT

## 2022-12-20 PROCEDURE — 83735 ASSAY OF MAGNESIUM: CPT

## 2022-12-20 PROCEDURE — 65270000029 HC RM PRIVATE

## 2022-12-20 PROCEDURE — 77030038269 HC DRN EXT URIN PURWCK BARD -A

## 2022-12-20 PROCEDURE — 81001 URINALYSIS AUTO W/SCOPE: CPT

## 2022-12-20 PROCEDURE — 74011250636 HC RX REV CODE- 250/636: Performed by: INTERNAL MEDICINE

## 2022-12-20 PROCEDURE — 74011000250 HC RX REV CODE- 250: Performed by: INTERNAL MEDICINE

## 2022-12-20 PROCEDURE — 74011250637 HC RX REV CODE- 250/637: Performed by: INTERNAL MEDICINE

## 2022-12-20 PROCEDURE — 85610 PROTHROMBIN TIME: CPT

## 2022-12-20 PROCEDURE — 80053 COMPREHEN METABOLIC PANEL: CPT

## 2022-12-20 PROCEDURE — U0003 INFECTIOUS AGENT DETECTION BY NUCLEIC ACID (DNA OR RNA); SEVERE ACUTE RESPIRATORY SYNDROME CORONAVIRUS 2 (SARS-COV-2) (CORONAVIRUS DISEASE [COVID-19]), AMPLIFIED PROBE TECHNIQUE, MAKING USE OF HIGH THROUGHPUT TECHNOLOGIES AS DESCRIBED BY CMS-2020-01-R: HCPCS

## 2022-12-20 PROCEDURE — 36415 COLL VENOUS BLD VENIPUNCTURE: CPT

## 2022-12-20 RX ORDER — HYDRALAZINE HYDROCHLORIDE 20 MG/ML
10 INJECTION INTRAMUSCULAR; INTRAVENOUS
Status: DISCONTINUED | OUTPATIENT
Start: 2022-12-20 | End: 2022-12-23 | Stop reason: HOSPADM

## 2022-12-20 RX ADMIN — SODIUM CHLORIDE, PRESERVATIVE FREE 10 ML: 5 INJECTION INTRAVENOUS at 20:13

## 2022-12-20 RX ADMIN — ENOXAPARIN SODIUM 40 MG: 100 INJECTION SUBCUTANEOUS at 09:03

## 2022-12-20 RX ADMIN — CLONIDINE HYDROCHLORIDE 0.1 MG: 0.1 TABLET ORAL at 20:13

## 2022-12-20 RX ADMIN — ACETAMINOPHEN 1000 MG: 500 TABLET ORAL at 06:36

## 2022-12-20 RX ADMIN — SODIUM CHLORIDE 75 ML/HR: 9 INJECTION, SOLUTION INTRAVENOUS at 12:24

## 2022-12-20 RX ADMIN — SODIUM CHLORIDE, PRESERVATIVE FREE 10 ML: 5 INJECTION INTRAVENOUS at 06:38

## 2022-12-20 RX ADMIN — CLONIDINE HYDROCHLORIDE 0.1 MG: 0.1 TABLET ORAL at 09:04

## 2022-12-20 RX ADMIN — Medication 3 MG: at 20:15

## 2022-12-20 RX ADMIN — VALSARTAN 40 MG: 40 TABLET, FILM COATED ORAL at 18:22

## 2022-12-20 RX ADMIN — ACETAMINOPHEN 650 MG: 325 TABLET, FILM COATED ORAL at 18:22

## 2022-12-20 RX ADMIN — VALSARTAN 80 MG: 80 TABLET ORAL at 09:03

## 2022-12-20 NOTE — PROGRESS NOTES
0900: Dr. Johana Cortés notified of patient bp 181/78, ordered to administer scheduled PO blood pressure meds and recheck bp.    1028: /66, Dr. Johana Cortés notified

## 2022-12-20 NOTE — PROGRESS NOTES
Bedside shift change report given to Cha Grant (oncoming nurse) by Leland Gregory (offgoing nurse). Report included the following information SBAR, Kardex, ED Summary, OR Summary, MAR, Accordion, Recent Results, and Med Rec Status.

## 2022-12-20 NOTE — PROGRESS NOTES
Problem: Falls - Risk of  Goal: *Absence of Falls  Description: Document Angy Rubalcava Fall Risk and appropriate interventions in the flowsheet.   Outcome: Progressing Towards Goal  Note: Fall Risk Interventions:

## 2022-12-20 NOTE — PROGRESS NOTES
Nurse tech called for blood pressure elevation rechecked see flow sheet. Patient NPO and she states that she did not get her blood pressure meds yesterday. Notified that Clonidine is scheduled at 0900 am. Declined getting hydralazine she states that the tech took her blood pressure after activity os using BSC, washing up and dressing.  Tylenol for pain administered at this time will recheck blood pressure in 30 minutes after patient settling

## 2022-12-20 NOTE — PROGRESS NOTES
12/20/2022 1:18 PM CM requested COVID PCR from pt's RN, Robby Draper for placement. 12/20/2022 12:31 PM   Care Management Assessment      Reason for Admission: Emergency - Surgery planned for Thursday      ICD-10-CM ICD-9-CM    1. Ambulatory dysfunction  R26.2 719.7       2. Closed fracture of right ankle with routine healing, subsequent encounter  S82.741Y V54.19           Assessment:   [x]In person with pt and pt's , Romy Blackwell  Charted address and phone numbers confirmed. RUR: 12%  Risk Level: [x]Low []Moderate []High  Value-based purchasing: [] Yes [x] No    Advance Directive: Full Code.    [] No AD on file. [x] AD on file. [] Current AD not on file. Copy requested. [] Requests AD, and referral submitted to [facility]. Healthcare Decision Maker:         Assessment:    Age: 81 y.o. Sex: [] Male [x]Female     Residency: [x]Private residence []Apartment []Assisted Living []LTC []Other:   Exterior Steps: 0   Interior Steps: lives on the 1st floor    Lives With: [x]With spouse []Other family members []Underage children []Alone []Care provider []Other:    Prior functioning:  [x]Independent with ADLs and iADLS prior to fall. Since fall pt has been wheelchair bound. Pt is able to transfer in and out of the wheelchair with assistance from her . Pt was able to self propel wheelchair. Cognition: [x]Intact []Some spheres some of the time. []Some spheres all of the time. []All spheres all of the time.      Prior transportation method: [x]Self [x]Spouse []Other family members []Medicaid transport []Other:     Prior DME required: Wheelchair with elevating leg rests and RW    Rehab history: [x]None []Outpatient PT []Home Health (Provider/Date: ) []SNF (Provider/Date: ) []IPR (Provider/Date: ) []LTC (Provider/Date: ) []Hospice (Provider/Date: )  []Other:     Covid vaccination status:   [x] Yes, Type:  [x] Booster 1 [x] Booster 2&3  [] No  [] N/A / Not asked    Insurer:   Insurance Information [place] [employer] Phone: [phone]    Subscriber: Dede Morgan Subscriber#: [id number]    Group#: -- Precert#: --        FERNANDO/BSALEXX KING Phone: --    Subscriber: Dede Morgan Subscriber#: [id number]    Group#: [id number] Precert#: --            PCP: Marjorie [place]   Address: [address] [address]   Phone number: [phone]   Current patient: [x]Yes []No   Approximate date of last visit: 2 months    Access to virtual PCP visits: []Yes []No       Pharmacy: [facility] TBD at this time      Transition of care plan:  Noted plan for surgery on Thursday and need for rehab placement following. CM discussed IPR vs SNF placement. Pt signed choice letter for,  [facility]  [facility] [address]    Referrals have been sent to "[employer] [id number]  Boris Fuentes, and Stalin Abbott via [facility]. Referral to [facility] via [employer]. CM will follow. CRISTINA Paiz    Care Management Interventions  PCP Verified by CM:  Yes  Liliana Signup: No  Discharge Durable Medical Equipment: No  Physical Therapy Consult: Yes  Occupational Therapy Consult: Yes  Support Systems: Spouse/Significant Other  Confirm Follow Up Transport: Family  The Plan for Transition of Care is Related to the Following Treatment Goals : IPR vs SNF  The Patient and/or Patient Representative was Provided with a Choice of Provider and Agrees with the Discharge Plan?: Yes  Freedom of Choice List was Provided with Basic Dialogue that Supports the Patient's Individualized Plan of Care/Goals, Treatment Preferences and Shares the Quality Data Associated with the Providers?: Yes  Discharge Location  Patient Expects to be Discharged to[de-identified] Rehab hospital/unit acute

## 2022-12-20 NOTE — PROGRESS NOTES
Problem: Lower Extremity Fracture:Day of Admission  Goal: Activity/Safety  Outcome: Progressing Towards Goal  Goal: Consults, if ordered  Outcome: Progressing Towards Goal  Goal: Diagnostic Test/Procedures  Outcome: Progressing Towards Goal  Goal: Nutrition/Diet  Outcome: Progressing Towards Goal  Goal: Medications  Outcome: Progressing Towards Goal  Goal: Respiratory  Outcome: Progressing Towards Goal  Goal: Treatments/Interventions/Procedures  Outcome: Progressing Towards Goal  Goal: Psychosocial  Outcome: Progressing Towards Goal  Goal: *Optimal pain control at patient's stated goal  Outcome: Progressing Towards Goal  Goal: *Hemodynamically stable  Outcome: Progressing Towards Goal  Goal: *Adequate oxygenation  Outcome: Progressing Towards Goal

## 2022-12-20 NOTE — CONSULTS
ORTHOPEDIC SURGERY CONSULT    Subjective:     Date of Consultation:  2022    Referring Physician:  Dr. Gaudencio Gannon is a 81 y.o. female who is being seen for a right trimalleolar ankle fracture. She has a past medical history of psoriasis, PVD, RA on methotrexate weekly, CVA, HTN and HLD. She suffered a ground level fall at a store where she tripped on . She inverted and twisted her right ankle and had immediate pain in her right ankle. She was seen in the ER on  for the above fracture. She was splinted and followed up with Dr. Chloé Garcia who recommended surgery. She was seen in preadmission testing on 12/15. She has been unable to maintain NWB and was referred to the ER for admission for surgery on  with Dr. Chloé Garcia. She has been admitted by the hospitalist service and we are following for her fracture. She denies SOB, CP, dyspnea, or calf pain.       Patient Active Problem List    Diagnosis Date Noted    Trimalleolar fracture of right ankle 2022    Cellulitis 2018    Psoriasis 2018    Leg swelling 2018    PVD (peripheral vascular disease) ([facility] [address] 2018    Bilateral leg edema 2018    Vasculitis ([facility] [address] 2018    Hyponatremia 2018    Hypercalcemia 2018    CVA (cerebral infarction) 2012    HTN (hypertension) 2012    Hyperlipidemia 2012     Family History   Problem Relation Age of Onset    Other Mother          from surgical complications    Hypertension Mother     Hypertension Father     Stroke Father       Social History     Tobacco Use    Smoking status: Never    Smokeless tobacco: Never   Substance Use Topics    Alcohol use: Not Currently     Alcohol/week: 3.0 standard drinks     Types: 2 Glasses of wine, 1 Standard drinks or equivalent per week     Comment: red wine, one glass a night     Past Medical History:   Diagnosis Date    Adverse effect of anesthesia     Anesthesia     required prolonged warming , moderate amount of anesthesia    H/O psoriasis     Hyperlipidemia     Hypertension     Stroke ([facility] [address] 05/01/2012    TIA      Past Surgical History:   Procedure Laterality Date    HX CATARACT REMOVAL      HX HYSTERECTOMY      HX HYSTERECTOMY      HX OTHER SURGICAL      removed skin cancer from chest    HX SEPTOPLASTY      repaired    TX NASAL SURG PROC UNLISTED        Prior to Admission medications    Medication Sig Start Date End Date Taking? Authorizing Provider   BIOTIN PO Take 1,000 mcg by mouth daily. Provider, Historical   docosahexaenoic acid/epa (FISH OIL PO) Take 1,200 mg by mouth daily. Provider, Historical   folic acid (FOLVITE) 1 mg tablet Take 1 mg by mouth daily. Provider, Historical   ferrous sulfate (IRON PO) Take 1 Tablet by mouth every Monday and Friday. Provider, Historical   methotrexate (RHEUMATREX) 2.5 mg tablet Take 7.5 mg by mouth every seven (7) days. Provider, Historical   valsartan (DIOVAN) 80 mg tablet Take 80 mg by mouth Every morning. Provider, Historical   acetaminophen (Tylenol Extra Strength) 500 mg tablet Take 500 mg by mouth as needed for Pain. Provider, Historical   cyanocobalamin, vitamin B-12, (VITAMIN B-12 PO) Take 1 Tablet by mouth every Monday and Friday. Provider, Historical   l.acidoph-B.lactis-B.longum (FLORAJEN3) 460 mg (7.5-6- 1.5 bill. cell) cap cap Take 1 Cap by mouth Daily (before breakfast). 4/5/18   Faith Whitlock MD   cholecalciferol (VITAMIN D3) (2,000 UNITS /50 MCG) cap capsule Take 2,000 Units by mouth daily. Provider, Historical   aspirin 81 mg chewable tablet Take 81 mg by mouth daily. 5/30/12   Mary Brice MD   atorvastatin (LIPITOR) 10 mg tablet Take 1 Tab by mouth daily. 5/30/12   Mary Brice MD   cloNIDine (CATAPRES) 0.1 mg tablet Take 0.1 mg by mouth two (2) times a day. Hung, MD Maciel   valsartan (DIOVAN) 40 mg tablet Take 40 mg by mouth every evening.     Maciel Persaud MD   omega-3 fatty acids-vitamin e (FISH OIL) 1,000 mg Cap Take 1 Cap by mouth. Hung, MD Maciel     Current Facility-Administered Medications   Medication Dose Route Frequency    hydrALAZINE (APRESOLINE) 20 mg/mL injection 10 mg  10 mg IntraVENous Q6H PRN    sodium chloride (NS) flush 5-40 mL  5-40 mL IntraVENous Q8H    sodium chloride (NS) flush 5-40 mL  5-40 mL IntraVENous PRN    acetaminophen (TYLENOL) tablet 650 mg  650 mg Oral Q6H PRN    Or    acetaminophen (TYLENOL) suppository 650 mg  650 mg Rectal Q6H PRN    polyethylene glycol (MIRALAX) packet 17 g  17 g Oral DAILY PRN    ondansetron (ZOFRAN ODT) tablet 4 mg  4 mg Oral Q8H PRN    Or    ondansetron (ZOFRAN) injection 4 mg  4 mg IntraVENous Q6H PRN    enoxaparin (LOVENOX) injection 40 mg  40 mg SubCUTAneous DAILY    0.9% sodium chloride infusion  75 mL/hr IntraVENous CONTINUOUS    valsartan (DIOVAN) tablet 40 mg  40 mg Oral QPM    valsartan (DIOVAN) tablet 80 mg  80 mg Oral QAM    cloNIDine HCL (CATAPRES) tablet 0.1 mg  0.1 mg Oral BID    acetaminophen (TYLENOL) tablet 1,000 mg  1,000 mg Oral Q6H PRN    melatonin tablet 3 mg  3 mg Oral QHS PRN     Allergies   Allergen Reactions    Codeine Other (comments)     Hallucinations and dry heaves. Review of Systems:  Pertinent items are noted in HPI. Objective:     Patient Vitals for the past 8 hrs:   BP Temp Pulse Resp SpO2   22 1028 116/66 -- -- -- --   22 0855 -- -- -- -- 99 %   22 0850 (!) 181/78 98 °F (36.7 °C) 76 18 99 %   22 0545 (!) 172/65 -- 67 -- --   22 0527 (!) 190/88 98.2 °F (36.8 °C) 66 18 99 %     Temp (24hrs), Av °F (36.7 °C), Min:97.4 °F (36.3 °C), Max:98.4 °F (36.9 °C)        EXAM: GEN: Well appearing female in NAD  PSYCH:  AAO x 4  MUSC: Right ankle in a posterior short and short stirrup splint. Both splints are far too short, but intact. She has no complaints with the splint today. Able to wiggle her toes. Sensation intact in all 5 digits of the foot.   There is ecchymosis from distal to knee to the toes. The calf is soft and nontender. Able to palpate the DP pulse. All digits are warm. IMAGING:  Narrative & Impression   EXAM: XR ANKLE RT MIN 3 V     INDICATION: Right ankle velasquez C fracture. COMPARISON: 12/11/2022. FINDINGS: Three views of the right. The patient is in a splint which reduces  fine bony detail. The bones are osteopenic. Nonspecific fracture lines are again  seen in the base of the medial malleolus. There is a fracture of the lateral  malleolus extending upwards from the level of the tibiotalar joint. There is  minimal angulation of the lateral malleolus laterally. There is a fracture of  the posterior malleolus with superior shift by approximately 3 mm. The talar  dome remains in appropriate contact with the tibial plafond and. The talar dome  is intact. There is a plantar calcaneal enthesophyte. IMPRESSION  Unchanged trimalleolar ankle fracture. .       Data Review   Recent Results (from the past 24 hour(s))   CBC WITH AUTOMATED DIFF    Collection Time: 12/20/22  3:54 AM   Result Value Ref Range    WBC 7.0 3.6 - 11.0 K/uL    RBC 3.41 (L) 3.80 - 5.20 M/uL    HGB 11.7 11.5 - 16.0 g/dL    HCT 34.9 (L) 35.0 - 47.0 %    .3 (H) 80.0 - 99.0 FL    MCH 34.3 (H) 26.0 - 34.0 PG    MCHC 33.5 30.0 - 36.5 g/dL    RDW 15.0 (H) 11.5 - 14.5 %    PLATELET 812 066 - 418 K/uL    MPV 8.6 (L) 8.9 - 12.9 FL    NRBC 0.0 0  WBC    ABSOLUTE NRBC 0.00 0.00 - 0.01 K/uL    NEUTROPHILS 75 32 - 75 %    LYMPHOCYTES 13 12 - 49 %    MONOCYTES 10 5 - 13 %    EOSINOPHILS 1 0 - 7 %    BASOPHILS 0 0 - 1 %    IMMATURE GRANULOCYTES 1 (H) 0.0 - 0.5 %    ABS. NEUTROPHILS 5.2 1.8 - 8.0 K/UL    ABS. LYMPHOCYTES 0.9 0.8 - 3.5 K/UL    ABS. MONOCYTES 0.7 0.0 - 1.0 K/UL    ABS. EOSINOPHILS 0.1 0.0 - 0.4 K/UL    ABS. BASOPHILS 0.0 0.0 - 0.1 K/UL    ABS. IMM.  GRANS. 0.1 (H) 0.00 - 0.04 K/UL    DF AUTOMATED     METABOLIC PANEL, COMPREHENSIVE    Collection Time: 12/20/22  3:54 AM   Result Value Ref Range    Sodium 139 136 - 145 mmol/L    Potassium 4.5 3.5 - 5.1 mmol/L    Chloride 105 97 - 108 mmol/L    CO2 28 21 - 32 mmol/L    Anion gap 6 5 - 15 mmol/L    Glucose 109 (H) 65 - 100 mg/dL    BUN 22 (H) 6 - 20 MG/DL    Creatinine 0.72 0.55 - 1.02 MG/DL    BUN/Creatinine ratio 31 (H) 12 - 20      eGFR >60 >60 ml/min/1.73m2    Calcium 8.8 8.5 - 10.1 MG/DL    Bilirubin, total 0.6 0.2 - 1.0 MG/DL    ALT (SGPT) 25 12 - 78 U/L    AST (SGOT) 26 15 - 37 U/L    Alk. phosphatase 96 45 - 117 U/L    Protein, total 5.1 (L) 6.4 - 8.2 g/dL    Albumin 2.6 (L) 3.5 - 5.0 g/dL    Globulin 2.5 2.0 - 4.0 g/dL    A-G Ratio 1.0 (L) 1.1 - 2.2     MAGNESIUM    Collection Time: 12/20/22  3:54 AM   Result Value Ref Range    Magnesium 2.1 1.6 - 2.4 mg/dL   PROTHROMBIN TIME + INR    Collection Time: 12/20/22  3:54 AM   Result Value Ref Range    INR 1.0 0.9 - 1.1      Prothrombin time 10.6 9.0 - 11.1 sec         Assessment/Plan:   A: 1. Right ankle trimalleolar fracture     P: 1. Appreciate hospitalist admission and medical management. 2. Plan for right ankle ORIF Thursday with Dr. Hawa Kirby. NPO Wednesday night. Consent place for ORIF of right ankle. Hold methotrexate this week. 3. DVT ppx: Lovenox 40 mg SC daily while inpatient  4. PT/OT: NWB RLE. Wedge pillow   5. DISPO: SNF versus . 6. Orthopedics to follow. Discussed case with Dr. Hawa Kirby who agrees with plan.         Dank Perez, [place]  Orthopaedic Surgery PA  [address] [facility]

## 2022-12-20 NOTE — PROGRESS NOTES
[facility] [address]  [facility], [address] [place] [address]  [phone]      Hospitalist  Progress Note      NAME:         Paul Wheatley   :        1940  MRM:        [id number]    Date of service: 2022      Chief complaint: R ankle pain    Interval HPI: Patient still with moderate, right ankle aching discomfort. Worse with movement. Better with rest and pain medications. No fever or chills. No chest pain or SOB      Objective:    Vital Signs:    Visit Vitals  BP (!) 181/78 (BP 1 Location: Right upper arm, BP Patient Position: At rest)   Pulse 76   Temp 98 °F (36.7 °C)   Resp 18   Ht 5' 4\" (1.626 m)   Wt 52.2 kg (115 lb)   SpO2 99%   Breastfeeding No   BMI 19.74 kg/m²      No intake or output data in the 24 hours ending 22 0949     Physical Examination:    General:   Well looking patient in no acute distress  Eyes:   pink conjunctivae, PERRLA with no discharge. ENT:   no ottorrhea or rhinorrhea with dry mucous membranes  Neck: no masses, thyroid non-tender and trachea central.  Pulm:  no accessory muscle use, decreased and clear breath sounds without crackles or wheezes  Card:  no JVD or murmurs, has regular and normal S1, S2 without thrills, bruits or peripheral edema  Abd:  Soft, non-tender, non-distended, normoactive bowel sounds with no palpable organomegaly  Musc:  No cyanosis, clubbing, atrophy or deformities. Splinted R ankle  Skin:  No bruising or ulcers. Neuro: Awake and alert.  Generally a non focal exam. Follows commands appropriately  Psych:  Has a good insight and is oriented x 3    Current Facility-Administered Medications   Medication Dose Route Frequency    hydrALAZINE (APRESOLINE) 20 mg/mL injection 10 mg  10 mg IntraVENous Q6H PRN    sodium chloride (NS) flush 5-40 mL  5-40 mL IntraVENous Q8H    sodium chloride (NS) flush 5-40 mL  5-40 mL IntraVENous PRN    acetaminophen (TYLENOL) tablet 650 mg  650 mg Oral Q6H PRN    Or    acetaminophen (TYLENOL) suppository 650 mg  650 mg Rectal Q6H PRN    polyethylene glycol (MIRALAX) packet 17 g  17 g Oral DAILY PRN    ondansetron (ZOFRAN ODT) tablet 4 mg  4 mg Oral Q8H PRN    Or    ondansetron (ZOFRAN) injection 4 mg  4 mg IntraVENous Q6H PRN    enoxaparin (LOVENOX) injection 40 mg  40 mg SubCUTAneous DAILY    0.9% sodium chloride infusion  75 mL/hr IntraVENous CONTINUOUS    valsartan (DIOVAN) tablet 40 mg  40 mg Oral QPM    valsartan (DIOVAN) tablet 80 mg  80 mg Oral QAM    cloNIDine HCL (CATAPRES) tablet 0.1 mg  0.1 mg Oral BID    acetaminophen (TYLENOL) tablet 1,000 mg  1,000 mg Oral Q6H PRN    melatonin tablet 3 mg  3 mg Oral QHS PRN        Laboratory data and review:    Recent Labs     12/20/22  0354   WBC 7.0   HGB 11.7   HCT 34.9*        Recent Labs     12/20/22  0354      K 4.5      CO2 28   *   BUN 22*   CREA 0.72   CA 8.8   MG 2.1   ALB 2.6*   ALT 25   INR 1.0     No components found for: [place]    Diagnostics: Imaging studies have been reviewed    Assessment and Plan:    Trimalleolar fracture of right ankle (12/20/2022) POA: following a fall. Xrays right ankle confirm fracture. Allergic to codeine. Continue Acetaminophen. Consult orthopedic surgery. Enoxaparin for DVT prophylaxis for now. Likely surgery 12/21     HTN (hypertension) (5/29/2012) /  Hyperlipidemia (5/29/2012) POA: BPs uncontrolled partly from pain. Continue Clonidine, Valsartan. IV Hydralazine PRN. Controp pain. Resume Lipitor    Psoriasis (4/5/2018)/ RA POA: Hold Methotrexate    I personally reviewed chart, notes, data and current medications in the medical record. I have personally examined and treated the patient at bedside during this period. To assist coordination of care and communication with nursing and staff, this note may be preliminary early in the day, but finalized by end of the day.                  Care Plan discussed with: Patient, Care Manager, and Nursing Staff    Discussed:  Care Plan and D/C Planning    Prophylaxis:  Lovenox    Anticipated Disposition:  SNF/LTC           ___________________________________________________    Attending Physician:   Roopa Mcmahon MD

## 2022-12-20 NOTE — ED NOTES
TRANSFER - OUT REPORT:    Verbal report given to Liza Davis RN(name) on Lia Acuña  being transferred to [facility](unit) for routine progression of care       Report consisted of patients Situation, Background, Assessment and   Recommendations(SBAR). Information from the following report(s) SBAR and ED Summary was reviewed with the receiving nurse. Lines:   Peripheral IV 12/19/22 Left Antecubital (Active)   Site Assessment Clean, dry, & intact 12/19/22 1926   Phlebitis Assessment 0 12/19/22 1926   Infiltration Assessment 0 12/19/22 1926   Dressing Status Clean, dry, & intact 12/19/22 1926   Dressing Type Transparent 12/19/22 1926   Hub Color/Line Status Pink;Flushed 12/19/22 1926   Action Taken Blood drawn 12/19/22 1926        Opportunity for questions and clarification was provided.       Patient transported with:   [id number]

## 2022-12-21 LAB
SARS-COV-2, XPLCVT: NOT DETECTED
SOURCE, COVRS: NORMAL

## 2022-12-21 PROCEDURE — 97530 THERAPEUTIC ACTIVITIES: CPT

## 2022-12-21 PROCEDURE — 74011000250 HC RX REV CODE- 250: Performed by: INTERNAL MEDICINE

## 2022-12-21 PROCEDURE — 97162 PT EVAL MOD COMPLEX 30 MIN: CPT

## 2022-12-21 PROCEDURE — 2709999900 HC NON-CHARGEABLE SUPPLY

## 2022-12-21 PROCEDURE — 97165 OT EVAL LOW COMPLEX 30 MIN: CPT

## 2022-12-21 PROCEDURE — 74011250636 HC RX REV CODE- 250/636: Performed by: INTERNAL MEDICINE

## 2022-12-21 PROCEDURE — 77030038269 HC DRN EXT URIN PURWCK BARD -A

## 2022-12-21 PROCEDURE — 74011250637 HC RX REV CODE- 250/637: Performed by: INTERNAL MEDICINE

## 2022-12-21 PROCEDURE — 65270000029 HC RM PRIVATE

## 2022-12-21 RX ADMIN — ACETAMINOPHEN 650 MG: 325 TABLET, FILM COATED ORAL at 17:39

## 2022-12-21 RX ADMIN — SODIUM CHLORIDE, PRESERVATIVE FREE 10 ML: 5 INJECTION INTRAVENOUS at 20:41

## 2022-12-21 RX ADMIN — CLONIDINE HYDROCHLORIDE 0.1 MG: 0.1 TABLET ORAL at 08:47

## 2022-12-21 RX ADMIN — HYDRALAZINE HYDROCHLORIDE 10 MG: 20 INJECTION INTRAMUSCULAR; INTRAVENOUS at 05:23

## 2022-12-21 RX ADMIN — CLONIDINE HYDROCHLORIDE 0.1 MG: 0.1 TABLET ORAL at 20:39

## 2022-12-21 RX ADMIN — VALSARTAN 40 MG: 40 TABLET, FILM COATED ORAL at 17:39

## 2022-12-21 RX ADMIN — ENOXAPARIN SODIUM 40 MG: 100 INJECTION SUBCUTANEOUS at 08:46

## 2022-12-21 RX ADMIN — SODIUM CHLORIDE, PRESERVATIVE FREE 10 ML: 5 INJECTION INTRAVENOUS at 05:17

## 2022-12-21 RX ADMIN — ACETAMINOPHEN 650 MG: 325 TABLET, FILM COATED ORAL at 04:15

## 2022-12-21 RX ADMIN — VALSARTAN 80 MG: 80 TABLET ORAL at 08:47

## 2022-12-21 NOTE — PROGRESS NOTES
Orthopaedic Progress Note    2022 11:40 AM     Patient: Alesia Yarbrough MRN: [id number]  SSN: [id number]    YOB: 1940  Age: 81 y.o. Sex: female      Admit date:  2022  Admitting Physician:  Prince Lyudmila MD   Consulting Physician(s): Treatment Team: Attending Provider: Anne Dave MD; Surgeon: Dylan Khan MD; Consulting Provider: MAX Garcia; Care Manager: Jemima Casanova Occupational Therapist: Archana Becerra; Physical Therapist: Wilian Shi, PT, DPT    SUBJECTIVE:     Alesia Yarbrough is a 81 y.o. female that is completing working with PT this AM.  at bedside. Awaiting surgery tomorrow with Dr. Nazanin Mcrae. No complaints of nausea, vomiting, dizziness, lightheadedness, chest pain, or shortness of breath. OBJECTIVE:       Physical Exam:  General: Alert, cooperative, no distress. Respiratory: Respirations unlabored  Neurological:  Neurovascular exam within normal limits. Motor: + DF/PF. Musculoskeletal: Calves soft, supple, non-tender upon palpation. Splint:  Clean, dry and intact. No significant erythema or swelling. Able to wiggle toes. Thinner skin on exam of non-operative lower leg.       Vital Signs:        Patient Vitals for the past 8 hrs:   BP Temp Pulse Resp SpO2   22 1159 116/71 97.8 °F (36.6 °C) 91 18 98 %   22 0856 (!) 157/73 99 °F (37.2 °C) 68 16 94 %   22 0846 137/75 97.5 °F (36.4 °C) 100 18 96 %   22 0534 (!) 170/79 -- 68 -- --   22 0533 (!) 182/80 -- 67 -- --   22 0531 (!) 176/76 -- 68 -- --   22 0529 (!) 191/83 -- 78 -- --   22 0517 (!) 186/96 -- 70 -- --                                          Temp (24hrs), Av °F (36.7 °C), Min:97.5 °F (36.4 °C), Max:99 °F (37.2 °C)      Labs:        Recent Labs     22  0354   HCT 34.9*   HGB 11.7   INR 1.0     Lab Results   Component Value Date/Time    Sodium 139 2022 03:54 AM    Potassium 4.5 12/20/2022 03:54 AM    Chloride 105 12/20/2022 03:54 AM    CO2 28 12/20/2022 03:54 AM    Glucose 109 (H) 12/20/2022 03:54 AM    BUN 22 (H) 12/20/2022 03:54 AM    Creatinine 0.72 12/20/2022 03:54 AM    Calcium 8.8 12/20/2022 03:54 AM                  ASSESSMENT / PLAN:   Principal Problem:    Trimalleolar fracture of right ankle (12/20/2022)    Active Problems:    HTN (hypertension) (5/29/2012)      Overview: A.  Echo (5/30/12):  EF 65%, pseudo. Mildly dil LA. Mild MR/AR. PASP       40. Hyperlipidemia (5/29/2012)      Psoriasis (4/5/2018)       A:   Right trimalleolar ankle fracture  Plan for ORIF with Dr. Danyelle Resendiz tomorrow 12/22/22.    NPO  p MN  Hold Lovenox 12/22/22  Ancef 2 G ON-Call to OR       Pain Control: Good control today   DVT Prophylaxis: Lovenox, SCD's   Hemodynamics: Stable   Activity: NWB RLE   Disposition: Pending re-eval of PT/OT post-op     Signed By:  Nacho Scott, [address] [place] [address]

## 2022-12-21 NOTE — PROGRESS NOTES
Rounded on [facility] patients and provided Anointing of the Sick and Communion at request of patient.     Tae Brunson

## 2022-12-21 NOTE — PROGRESS NOTES
Patient is eating and drinking adequately and is refusing to continue IV fluids at this time. IV fluids held in [facility]     0745- Bedside and Verbal shift change report given to Cedric Mcginnis RN (oncoming nurse) by Ml Martinez RN (offgoing nurse). Report included the following information SBAR, Kardex, Intake/Output, and MAR.

## 2022-12-21 NOTE — PROGRESS NOTES
Ayaan Junior [facility]  [address] [facility], [address] [place] [address]  [phone]      Hospitalist  Progress Note      NAME:         Radha Santos   :        1940  MRM:        [id number]    Date of service: 2022      Chief complaint: R ankle pain    Interval HPI: Patient says she feels well. Mild aching right ankle aching discomfort. Worse with movement. No new symptoms       Objective:    Vital Signs:    Visit Vitals  /71 (BP 1 Location: Right upper arm, BP Patient Position: Sitting)   Pulse 91   Temp 97.8 °F (36.6 °C)   Resp 18   Ht 5' 4\" (1.626 m)   Wt 52.2 kg (115 lb)   SpO2 98%   Breastfeeding No   BMI 19.74 kg/m²        Intake/Output Summary (Last 24 hours) at 2022 1241  Last data filed at 2022 0856  Gross per 24 hour   Intake 660 ml   Output 1200 ml   Net -540 ml        Physical Examination:    General:   Well looking patient in no acute distress  Eyes:   pink conjunctivae, PERRLA with no discharge. ENT:   no ottorrhea or rhinorrhea with dry mucous membranes  Neck: no masses, thyroid non-tender and trachea central.  Pulm:  decreased and clear breath sounds without crackles or wheezes  Card:  no JVD or murmurs, has regular and normal S1, S2 without thrills, bruits or peripheral edema  Abd:  Soft, non-tender, non-distended, normoactive bowel sounds   Musc:  No cyanosis, clubbing, atrophy or deformities. Splinted R ankle  Skin:  No bruising or ulcers. Neuro: Awake and alert.  Generally a non focal exam. Follows commands appropriately  Psych:  Has a good insight and is oriented x 3    Current Facility-Administered Medications   Medication Dose Route Frequency    ceFAZolin (ANCEF) 2 g in sterile water (preservative free) 20 mL IV syringe  2 g IntraVENous ON CALL TO OR    hydrALAZINE (APRESOLINE) 20 mg/mL injection 10 mg  10 mg IntraVENous Q6H PRN    sodium chloride (NS) flush 5-40 mL  5-40 mL IntraVENous Q8H    sodium chloride (NS) flush 5-40 mL  5-40 mL IntraVENous PRN    acetaminophen (TYLENOL) tablet 650 mg  650 mg Oral Q6H PRN    Or    acetaminophen (TYLENOL) suppository 650 mg  650 mg Rectal Q6H PRN    polyethylene glycol (MIRALAX) packet 17 g  17 g Oral DAILY PRN    ondansetron (ZOFRAN ODT) tablet 4 mg  4 mg Oral Q8H PRN    Or    ondansetron (ZOFRAN) injection 4 mg  4 mg IntraVENous Q6H PRN    [Held by provider] enoxaparin (LOVENOX) injection 40 mg  40 mg SubCUTAneous DAILY    0.9% sodium chloride infusion  75 mL/hr IntraVENous CONTINUOUS    valsartan (DIOVAN) tablet 40 mg  40 mg Oral QPM    valsartan (DIOVAN) tablet 80 mg  80 mg Oral QAM    cloNIDine HCL (CATAPRES) tablet 0.1 mg  0.1 mg Oral BID    acetaminophen (TYLENOL) tablet 1,000 mg  1,000 mg Oral Q6H PRN    melatonin tablet 3 mg  3 mg Oral QHS PRN        Laboratory data and review:    Recent Labs     12/20/22  0354   WBC 7.0   HGB 11.7   HCT 34.9*        Recent Labs     12/20/22  0354      K 4.5      CO2 28   *   BUN 22*   CREA 0.72   CA 8.8   MG 2.1   ALB 2.6*   ALT 25   INR 1.0     No components found for: [place]    Diagnostics: Imaging studies have been reviewed    Assessment and Plan:    Trimalleolar fracture of right ankle (12/20/2022) POA: following a fall. Xrays right ankle confirm fracture. Allergic to codeine. Continue Acetaminophen. Seen by orthopedic surgery. Enoxaparin for DVT prophylaxis. Plan for surgery 12/21     HTN (hypertension) (5/29/2012) /  Hyperlipidemia (5/29/2012) POA: BPs variable. Continue Clonidine, Valsartan. IV Hydralazine PRN. Control pain. On Lipitor    Psoriasis (4/5/2018)/ RA POA: Hold Methotrexate    I personally reviewed chart, notes, data and current medications in the medical record. I have personally examined and treated the patient at bedside during this period.  To assist coordination of care and communication with nursing and staff, this note may be preliminary early in the day, but finalized by end of the day.                  Care Plan discussed with: Patient, Care Manager, and Nursing Staff    Discussed:  Care Plan and D/C Planning    Prophylaxis:  Lovenox    Anticipated Disposition:  SNF/LTC           ___________________________________________________    Attending Physician:   Justino Verdugo MD

## 2022-12-21 NOTE — PROGRESS NOTES
Physical Therapy  Patient currently awaiting ORIF of the Right ankle. Spoke with nursing, patient has been transferring with them. We will evaluate following surgery. Thank you.     Idalia Aparicio PT,DPT,NCS

## 2022-12-21 NOTE — PROGRESS NOTES
Problem: Mobility Impaired (Adult and Pediatric)  Goal: *Acute Goals and Plan of Care (Insert Text)  Description: FUNCTIONAL STATUS PRIOR TO ADMISSION: Patient was independent and active without use of DME prior to the fall. Since fall on 12/11 patient has required assistance for ADL's and transfers due to NWB status    HOME [facility] [address]: The patient lived with  but did not require assist.    Physical Therapy Goals  Initiated 12/21/2022  1. Patient will move from supine to sit and sit to supine  in bed with supervision/set-up within 7 day(s). 2.  Patient will transfer from bed to chair and chair to bed with supervision/set-up using the least restrictive device within 7 day(s). 3.  Patient will perform sit to stand with supervision/set-up within 7 day(s). Outcome: Progressing Towards Goal  Note:   PHYSICAL THERAPY EVALUATION  Patient: Alejandra Asher (26 y.o. female)  Date: 12/21/2022  Primary Diagnosis: Fx ankle [S82.899A]       Precautions:   Fall, NWB      ASSESSMENT  Based on the objective data described below, the patient presents with ankle fracture from fall on 12/11 with resulting tri-malleolar fracture and was at pre-op testing and found to be increased assistance at home. Today, patient was able to perform stand pivot transfer to bedside commode and chair, she is able to maintain her NWB status. Patient vitals stable throughout. Patient is planned for ORIF tomorrow 12/22    Current Level of Function Impacting Discharge (mobility/balance): SBA for bed mobility, CGA for SPT to chair/commode    Functional Outcome Measure: The patient scored Total: 55/100 on the Barthel Index outcome measure. Other factors to consider for discharge:      Patient will benefit from skilled therapy intervention to address the above noted impairments.        PLAN :  Recommendations and Planned Interventions: bed mobility training, transfer training, gait training, therapeutic exercises, and therapeutic activities      Frequency/Duration: Patient will be followed by physical therapy:  5 times a week to address goals. Recommendation for discharge: (in order for the patient to meet his/her long term goals)  Per MD-Therapy up to 5 days/week in rehab setting    This discharge recommendation:  Has been made in collaboration with the attending provider and/or case management    IF patient discharges home will need the following DME: patient owns DME required for discharge         SUBJECTIVE:   Patient stated I have just been getting back and forth.     OBJECTIVE DATA SUMMARY:   HISTORY:    Past Medical History:   Diagnosis Date    Adverse effect of anesthesia     Anesthesia     required prolonged warming , moderate amount of anesthesia    H/O psoriasis     Hyperlipidemia     Hypertension     Stroke ([facility] [address] 05/01/2012    TIA     Past Surgical History:   Procedure Laterality Date    HX CATARACT REMOVAL      HX HYSTERECTOMY      HX HYSTERECTOMY      HX OTHER SURGICAL      removed skin cancer from chest    HX SEPTOPLASTY      repaired    [place] NASAL SURG PROC UNLISTED         Personal factors and/or comorbidities impacting plan of care: see above    Home Situation  Home Environment: Private residence  # Steps to Enter: 0 (vai front entrance)  One/Two Story Residence: Two story, live on 1st floor  Living Alone: No  Support Systems: Spouse/Significant Other  Patient Expects to be Discharged to[de-identified] Rehab hospital/unit acute  Current DME Used/Available at Home: [employer], Wheelchair (shower bench)  Tub or Shower Type: Shower  Vitals:    12/21/22 0534 12/21/22 0846 12/21/22 0856 12/21/22 1159   BP: (!) 170/79 137/75 (!) 157/73 116/71   BP 1 Location: Left upper arm  Left upper arm Right upper arm   BP Patient Position: At rest;Supine  At rest Sitting   Pulse: 68 100 68 91   Temp:  97.5 °F (36.4 °C) 99 °F (37.2 °C) 97.8 °F (36.6 °C)   Resp:  18 16 18   Height:       Weight:       SpO2:  96% 94% 98% EXAMINATION/PRESENTATION/DECISION MAKING:   Critical Behavior:  Neurologic State: Alert  Orientation Level: Oriented X4  Cognition: Follows commands     Hearing: Auditory  Auditory Impairment: Hard of hearing, right side  Skin:  soft splint cast to Right LE, bruising noted throughout all 4 extremities from fall and on anticoagulation    Range Of Motion:  AROM: Generally decreased, functional           PROM: Within functional limits           Strength:    Strength: Generally decreased, functional (mikhail UE grossly observed to be 3+/5)                    Tone & Sensation:                                  Coordination:     Vision:      Functional Mobility:  Bed Mobility:     Supine to Sit: Stand-by assistance; Additional time     Scooting: Stand-by assistance; Additional time  Transfers:  Sit to Stand: Contact guard assistance;Stand-by assistance  Stand to Sit: Contact guard assistance;Supervision        Bed to Chair: Contact guard assistance;Stand-by assistance (stand pivot)              Balance:   Sitting: Intact  Standing: Impaired; Without support  Standing - Static: Constant support; Fair  Standing - Dynamic : Constant support; Fair (for stand pivot to [facility] and recliner)  Ambulation/Gait Training:                Functional Measure:  Barthel Index:    Bathin  Bladder: 10  Bowels: 10  Groomin  Dressin  Feeding: 10  Mobility: 0  Stairs: 0  Toilet Use: 5 (CGA/SBA stand pivot)  Transfer (Bed to Chair and Back): 10 (stand pivot)  Total: 55/100            Physical Therapy Evaluation Charge Determination   History Examination Presentation Decision-Making   MEDIUM  Complexity : 1-2 comorbidities / personal factors will impact the outcome/ POC  MEDIUM Complexity : 3 Standardized tests and measures addressing body structure, function, activity limitation and / or participation in recreation  LOW Complexity : Stable, uncomplicated  Other outcome measures barthel index  MEDIUM      Based on the above components, the patient evaluation is determined to be of the following complexity level: LOW     Pain Rating:  Premedicated prior to session    Activity Tolerance:   Fair    After treatment patient left in no apparent distress:   Sitting in chair, Call bell within reach, Bed / chair alarm activated, and Caregiver / family present    COMMUNICATION/EDUCATION:   The patients plan of care was discussed with: Occupational therapist, Registered nurse, and Case management. Fall prevention education was provided and the patient/caregiver indicated understanding., Patient/family have participated as able in goal setting and plan of care. , and Patient/family agree to work toward stated goals and plan of care.     Thank you for this referral.  Kel Buchanan, PT, DPT   Time Calculation: 22 mins

## 2022-12-21 NOTE — PROGRESS NOTES
Problem: Falls - Risk of  Goal: *Absence of Falls  Description: Document Jaelyn Jorgensen Fall Risk and appropriate interventions in the flowsheet.   Outcome: Progressing Towards Goal  Note: Fall Risk Interventions:  Mobility Interventions: Communicate number of staff needed for ambulation/transfer, Bed/chair exit alarm, Patient to call before getting OOB, PT Consult for mobility concerns, PT Consult for assist device competence, OT consult for ADLs         Medication Interventions: Bed/chair exit alarm, Teach patient to arise slowly, Utilize gait belt for transfers/ambulation, Patient to call before getting OOB, Evaluate medications/consider consulting pharmacy    Elimination Interventions: Bed/chair exit alarm, Call light in reach, Patient to call for help with toileting needs, Stay With Me (per policy), Toileting schedule/hourly rounds, Toilet paper/wipes in reach    History of Falls Interventions: Bed/chair exit alarm, Consult care management for discharge planning, Evaluate medications/consider consulting pharmacy, Investigate reason for fall, Door open when patient unattended, Assess for delayed presentation/identification of injury for 48 hrs (comment for end date), Utilize gait belt for transfer/ambulation, Vital signs minimum Q4HRs X 24 hrs (comment for end date)         Problem: Patient Education: Go to Patient Education Activity  Goal: Patient/Family Education  Outcome: Progressing Towards Goal

## 2022-12-21 NOTE — PROGRESS NOTES
12/21/2022 5:27 PM CM received message from Sixto at [facility], she is hopeful to have an admission decision in the AM.   CM met with pt and relayed [facility] and [facility] are pending. Confirmed with [facility] has accepted pt and [facility] has denied due to beds. CM will follow. 12/21/2022 3:29 PM CM sent updates to [facility] via [employer]. CM sent message to Thayer at [facility] requesting admission decision. [facility] also following pt, unable to make a decision until after surgery. CM will follow up.     12/21/2022 1:35 PM Planning for surgery tomorrow with Dr. Cherelle June. Pt requesting rehab placement following discharge. Referrals pending with [facility] and [facility]. [facility] has denied due to bed availability and [facility] has accepted. Following for PT and OT evals.    GABRIEL GoldsmithW

## 2022-12-21 NOTE — PROGRESS NOTES
1900: Bedside shift change report given to Jil (oncoming nurse) by Randall Naik (offgoing nurse). Report included the following information SBAR, Kardex, Intake/Output, MAR, and Recent Results.

## 2022-12-21 NOTE — PROGRESS NOTES
Problem: Self Care Deficits Care Plan (Adult)  Goal: *Acute Goals and Plan of Care (Insert Text)  Description: FUNCTIONAL STATUS PRIOR TO ADMISSION: Patient was independent and active without use of DME however since injury, spouse assisting patient with stand pivots to [facility] and commode and assisting with self care tasks. HOME SUPPORT: The patient lived with spouse but did not require assist at baseline (assist for transfers and self care since injury). Occupational Therapy Goals  Initiated 12/21/2022  1. Patient will perform grooming with modified independence within 7 day(s). 2.  Patient will perform upper body bathing/dressing with modified independence within 7 day(s). 3.  Patient will perform lower body bathing/dressing with modified independence within 7 day(s). 4.  Patient will perform BSC progress to standard transfers (if able to stand and hop) with modified independence within 7 day(s). 5.  Patient will perform all aspects of toileting with modified independence within 7 day(s). 6.  Patient will participate in upper extremity therapeutic exercise/activities with modified independence for 10 minutes within 7 day(s). 7.  Patient will utilize energy conservation techniques during functional activities with verbal cues within 7 day(s). Outcome: Not Met     OCCUPATIONAL THERAPY EVALUATION  Patient: Giuseppe Oseguera (86 y.o. female)  Date: 12/21/2022  Primary Diagnosis: Fx ankle [S82.899A]       Precautions:  Fall, NWB RLE    ASSESSMENT  Patient is 82 y/o female came to [facility] with right ankle pain and difficulty ambulating after recent right ankle fracture after [facility] adm 12/21/2022 for right trimalleolar ankle fracture (planned sx 12/22/2022 per chart review and is currently NWB). Patient received semi supine in bed A&OX4 and agreeable for OT/PT eval/tx.  Per pt report, pt live with spouse in two story home (stays on first level) with walk in entrance and is at baseline independent for self care and functional transfers/mobility (volunteer at [facility]) however since injury on 12/11/2022 patient requiring assist for self care and transfers to [facility] and [facility]. Patient presents with decreased activity tolerance, decreased balance and increased need for assist with self care (min A robin protective underwear and SBA toileting hygiene, SBA grooming seated on commode) and functional transfers/mobility (SBA sup->sit and scooting EOB, CGA/SBA sit<->Stand and stand->pivot to [facility] and recliner with gait belt maintaining NWB of RLE). Patient educated on LB dressing technique of injured leg in first out last with pt verbalizing understanding however would benefit from review and practice with therapy. Patient would benefit from continued skilled OT services while at [facility] in order to increase safety and independence with self care and functional transfers/mobility. Recommend D/C per MD recommendation. Functional Outcome Measure: The patient scored Total: 55/100 on the Barthel Index outcome measure     Other factors to consider for discharge: time since onset, severity of deficits, PLOF     Patient will benefit from skilled therapy intervention to address the above noted impairments. PLAN :  Recommendations and Planned Interventions: self care training, functional mobility training, therapeutic exercise, balance training, therapeutic activities, endurance activities, patient education, and home safety training    Frequency/Duration: Patient will be followed by occupational therapy 5 times a week to address goals. Recommendation for discharge: (in order for the patient to meet his/her long term goals)  Per MD recommendation    This discharge recommendation:  Has been made in collaboration with the attending provider and/or case management    IF patient discharges home will need the following DME: TBD       SUBJECTIVE:   Patient stated I have been getting to the [facility].     OBJECTIVE DATA SUMMARY:   HISTORY: Past Medical History:   Diagnosis Date    Adverse effect of anesthesia     Anesthesia     required prolonged warming , moderate amount of anesthesia    H/O psoriasis     Hyperlipidemia     Hypertension     Stroke ([facility] [address] 05/01/2012    TIA     Past Surgical History:   Procedure Laterality Date    HX CATARACT REMOVAL      HX HYSTERECTOMY      HX HYSTERECTOMY      HX OTHER SURGICAL      removed skin cancer from chest    HX SEPTOPLASTY      repaired    [place] NASAL SURG PROC UNLISTED       Expanded or extensive additional review of patient history:     Home Situation  Home Environment: Private residence  # Steps to Enter: 0 (vai front entrance)  One/Two Story Residence: Two story, live on 1st floor  Living Alone: No  Support Systems: Spouse/Significant Other  Patient Expects to be Discharged to[de-identified] Rehab hospital/unit acute  Current DME Used/Available at Home: Laurel Ra, Wheelchair (shower bench)  Tub or Shower Type: Shower    EXAMINATION OF PERFORMANCE DEFICITS:  Cognitive/Behavioral Status:  Neurologic State: Alert  Orientation Level: Oriented X4  Cognition: Follows commands     Hearing: Auditory  Auditory Impairment: Hard of hearing, right side    Range of Motion:  AROM: Generally decreased, functional  PROM: Within functional limits     Strength:  Strength: Generally decreased, functional (mikhail UE grossly observed to be 3+/5)     Balance:  Sitting: Intact  Standing: Impaired; Without support  Standing - Static: Constant support; Fair  Standing - Dynamic : Constant support; Fair (for stand pivot to [facility] and recliner)    Functional Mobility and Transfers for ADLs:  Bed Mobility:  Supine to Sit: Stand-by assistance; Additional time  Scooting: Stand-by assistance; Additional time    Transfers:  Sit to Stand: Contact guard assistance;Stand-by assistance  Stand to Sit: Contact guard assistance;Supervision  Bed to Chair: Contact guard assistance;Stand-by assistance (stand pivot)  Toilet Transfer : Contact guard assistance;Stand-by assistance (stand pivot to [facility])  Assistive Device : Gait Belt;Walker, rolling    ADL Assessment:     Oral Facial Hygiene/Grooming: Stand-by assistance    Lower Body Dressing: Minimum assistance    Toileting: Contact guard assistance;Minimum assistance     ADL Intervention and task modifications:     Grooming  Grooming Assistance: Stand-by assistance  Position Performed:  (seated on commode)  Washing Face: Stand-by assistance  Washing Hands: Stand-by assistance    Lower Body Dressing Assistance  Underpants: Minimum assistance  Position Performed: Seated in chair;Standing    Toileting  Toileting Assistance: Minimum assistance;Stand-by assistance  Bladder Hygiene: Stand-by assistance  Bowel Hygiene: Stand-by assistance  Clothing Management: Minimum assistance     Functional Measure:    Barthel Index:  Bathin  Bladder: 10  Bowels: 10  Groomin  Dressin  Feeding: 10  Mobility: 0  Stairs: 0  Toilet Use: 5 (CGA/SBA stand pivot)  Transfer (Bed to Chair and Back): 10 (stand pivot)  Total: 55/100      The Barthel ADL Index: Guidelines  1. The index should be used as a record of what a patient does, not as a record of what a patient could do. 2. The main aim is to establish degree of independence from any help, physical or verbal, however minor and for whatever reason. 3. The need for supervision renders the patient not independent. 4. A patient's performance should be established using the best available evidence. Asking the patient, friends/relatives and nurses are the usual sources, but direct observation and common sense are also important. However direct testing is not needed. 5. Usually the patient's performance over the preceding 24-48 hours is important, but occasionally longer periods will be relevant. 6. Middle categories imply that the patient supplies over 50 per cent of the effort. 7. Use of aids to be independent is allowed.     Score Interpretation (from [facility] [address])    Independent   60-79 Minimally independent   40-59 Partially dependent   20-39 Very dependent   <20 Totally dependent     -Cyn Hills, Barthel, D.W. (6262). Functional evaluation: the Barthel Index. [facility] ([address] (1997). The Barthel activities of daily living index: self-reporting versus actual performance in the old (> or = 75 years). Journal of [address] [place] [id number] 457), [facility], CAR, [facility]., Antonio See. (1999). Measuring the change in disability after inpatient rehabilitation; comparison of the responsiveness of the Barthel Index and Functional Baxter Measure. Journal of Neurology, Neurosurgery, and Psychiatry, 66(4), 032-192. SHINE Pierson, MARIO Villasenor, & Sue Fonseca M.A. (2004) Assessment of post-stroke quality of life in cost-effectiveness studies: The usefulness of the Barthel Index and the EuroQoL-5D. Quality of Life Research, 15, 355-58     Occupational Therapy Evaluation Charge Determination   History Examination Decision-Making   LOW Complexity : Brief history review  LOW Complexity : 1-3 performance deficits relating to physical, cognitive , or psychosocial skils that result in activity limitations and / or participation restrictions  MEDIUM Complexity : Patient may present with comorbidities that affect occupational performnce.  Miniml to moderate modification of tasks or assistance (eg, physical or verbal ) with assesment(s) is necessary to enable patient to complete evaluation       Based on the above components, the patient evaluation is determined to be of the following complexity level: LOW   Pain Rating:  No pain reported    Activity Tolerance:   Good    After treatment patient left in no apparent distress:    Sitting in chair, Call bell within reach, Bed / chair alarm activated, and Caregiver / family present    COMMUNICATION/EDUCATION:   The patients plan of care was discussed with: Physical therapist and Registered nurse. Co-treatment completed with PT for increased patient and clinician safety    Home safety education was provided and the patient/caregiver indicated understanding. and Patient/family have participated as able in goal setting and plan of care. This patients plan of care is appropriate for delegation to [facility].     Thank you for this referral.  Bouchra Parrish  Time Calculation: 22 mins

## 2022-12-22 ENCOUNTER — APPOINTMENT (OUTPATIENT)
Dept: GENERAL RADIOLOGY | Age: 82
End: 2022-12-22
Attending: INTERNAL MEDICINE
Payer: MEDICARE

## 2022-12-22 ENCOUNTER — ANESTHESIA EVENT (OUTPATIENT)
Dept: SURGERY | Age: 82
End: 2022-12-22
Payer: MEDICARE

## 2022-12-22 ENCOUNTER — ANESTHESIA (OUTPATIENT)
Dept: SURGERY | Age: 82
End: 2022-12-22
Payer: MEDICARE

## 2022-12-22 PROCEDURE — 74011000250 HC RX REV CODE- 250: Performed by: PHYSICIAN ASSISTANT

## 2022-12-22 PROCEDURE — 65270000029 HC RM PRIVATE

## 2022-12-22 PROCEDURE — 74011250636 HC RX REV CODE- 250/636: Performed by: NURSE ANESTHETIST, CERTIFIED REGISTERED

## 2022-12-22 PROCEDURE — 77030020274 HC MISC IMPL ORTHOPEDIC: Performed by: ORTHOPAEDIC SURGERY

## 2022-12-22 PROCEDURE — C1713 ANCHOR/SCREW BN/BN,TIS/BN: HCPCS | Performed by: ORTHOPAEDIC SURGERY

## 2022-12-22 PROCEDURE — 77030040361 HC SLV COMPR DVT MDII -B

## 2022-12-22 PROCEDURE — 77030037713 HC CLOSR DEV INCIS ZIP STRY -B: Performed by: ORTHOPAEDIC SURGERY

## 2022-12-22 PROCEDURE — 74011000250 HC RX REV CODE- 250: Performed by: INTERNAL MEDICINE

## 2022-12-22 PROCEDURE — 0QSG04Z REPOSITION RIGHT TIBIA WITH INTERNAL FIXATION DEVICE, OPEN APPROACH: ICD-10-PCS | Performed by: ORTHOPAEDIC SURGERY

## 2022-12-22 PROCEDURE — 77030002916 HC SUT ETHLN J&J -A: Performed by: ORTHOPAEDIC SURGERY

## 2022-12-22 PROCEDURE — 76010000149 HC OR TIME 1 TO 1.5 HR: Performed by: ORTHOPAEDIC SURGERY

## 2022-12-22 PROCEDURE — 74011250637 HC RX REV CODE- 250/637: Performed by: INTERNAL MEDICINE

## 2022-12-22 PROCEDURE — 77030003601 HC NDL NRV BLK BBMI -A

## 2022-12-22 PROCEDURE — 71045 X-RAY EXAM CHEST 1 VIEW: CPT

## 2022-12-22 PROCEDURE — 76060000033 HC ANESTHESIA 1 TO 1.5 HR: Performed by: ORTHOPAEDIC SURGERY

## 2022-12-22 PROCEDURE — 77030013837 HC NERV BLK KT BBMI -B

## 2022-12-22 PROCEDURE — 77030031139 HC SUT VCRL2 J&J -A: Performed by: ORTHOPAEDIC SURGERY

## 2022-12-22 PROCEDURE — 76210000006 HC OR PH I REC 0.5 TO 1 HR: Performed by: ORTHOPAEDIC SURGERY

## 2022-12-22 PROCEDURE — 0QSJ04Z REPOSITION RIGHT FIBULA WITH INTERNAL FIXATION DEVICE, OPEN APPROACH: ICD-10-PCS | Performed by: ORTHOPAEDIC SURGERY

## 2022-12-22 PROCEDURE — 74011000250 HC RX REV CODE- 250: Performed by: ANESTHESIOLOGY

## 2022-12-22 PROCEDURE — 74011250636 HC RX REV CODE- 250/636: Performed by: PHYSICIAN ASSISTANT

## 2022-12-22 PROCEDURE — 74011250636 HC RX REV CODE- 250/636: Performed by: ANESTHESIOLOGY

## 2022-12-22 PROCEDURE — 77030032758 HC BIT DRL DISP STRY -D: Performed by: ORTHOPAEDIC SURGERY

## 2022-12-22 PROCEDURE — 74011250637 HC RX REV CODE- 250/637: Performed by: ORTHOPAEDIC SURGERY

## 2022-12-22 PROCEDURE — 77030010507 HC ADH SKN DERMBND J&J -B

## 2022-12-22 PROCEDURE — 77030003747: Performed by: ORTHOPAEDIC SURGERY

## 2022-12-22 PROCEDURE — 77030002933 HC SUT MCRYL J&J -A: Performed by: ORTHOPAEDIC SURGERY

## 2022-12-22 PROCEDURE — 74011000250 HC RX REV CODE- 250: Performed by: NURSE ANESTHETIST, CERTIFIED REGISTERED

## 2022-12-22 PROCEDURE — 74011250636 HC RX REV CODE- 250/636: Performed by: INTERNAL MEDICINE

## 2022-12-22 PROCEDURE — 2709999900 HC NON-CHARGEABLE SUPPLY: Performed by: ORTHOPAEDIC SURGERY

## 2022-12-22 PROCEDURE — 74011250637 HC RX REV CODE- 250/637: Performed by: PHYSICIAN ASSISTANT

## 2022-12-22 PROCEDURE — 77030040922 HC BLNKT HYPOTHRM STRY -A

## 2022-12-22 DEVICE — LOCKING SCREW
Type: IMPLANTABLE DEVICE | Site: ANKLE | Status: FUNCTIONAL
Brand: VARIAX

## 2022-12-22 DEVICE — DISTAL LATERAL FIBULA PLATE, 5 HOLE
Type: IMPLANTABLE DEVICE | Site: ANKLE | Status: FUNCTIONAL
Brand: VARIAX

## 2022-12-22 DEVICE — IMPLANTABLE DEVICE: Type: IMPLANTABLE DEVICE | Site: ANKLE | Status: FUNCTIONAL

## 2022-12-22 DEVICE — SCREW BNE L30MM DIA3.5MM CORT ANK S STL NONLOCKING LO PROF: Type: IMPLANTABLE DEVICE | Site: ANKLE | Status: FUNCTIONAL

## 2022-12-22 DEVICE — KREULOCK COMPRESSION SCREW SS 3.5X30MM: Type: IMPLANTABLE DEVICE | Site: ANKLE | Status: FUNCTIONAL

## 2022-12-22 DEVICE — BONE SCREW
Type: IMPLANTABLE DEVICE | Site: ANKLE | Status: FUNCTIONAL
Brand: VARIAX

## 2022-12-22 DEVICE — PLATE BNE 3 H MED S STL LOK FOR ANK FRAC MGMT: Type: IMPLANTABLE DEVICE | Site: ANKLE | Status: FUNCTIONAL

## 2022-12-22 RX ORDER — DEXAMETHASONE SODIUM PHOSPHATE 4 MG/ML
INJECTION, SOLUTION INTRA-ARTICULAR; INTRALESIONAL; INTRAMUSCULAR; INTRAVENOUS; SOFT TISSUE AS NEEDED
Status: DISCONTINUED | OUTPATIENT
Start: 2022-12-22 | End: 2022-12-22 | Stop reason: HOSPADM

## 2022-12-22 RX ORDER — OXYCODONE HYDROCHLORIDE 5 MG/1
2.5 TABLET ORAL
Status: DISCONTINUED | OUTPATIENT
Start: 2022-12-22 | End: 2022-12-23 | Stop reason: HOSPADM

## 2022-12-22 RX ORDER — SODIUM CHLORIDE 9 MG/ML
125 INJECTION, SOLUTION INTRAVENOUS CONTINUOUS
Status: DISPENSED | OUTPATIENT
Start: 2022-12-22 | End: 2022-12-23

## 2022-12-22 RX ORDER — LIDOCAINE HYDROCHLORIDE 20 MG/ML
INJECTION, SOLUTION EPIDURAL; INFILTRATION; INTRACAUDAL; PERINEURAL AS NEEDED
Status: DISCONTINUED | OUTPATIENT
Start: 2022-12-22 | End: 2022-12-22 | Stop reason: HOSPADM

## 2022-12-22 RX ORDER — FERROUS SULFATE, DRIED 160(50) MG
1 TABLET, EXTENDED RELEASE ORAL
Status: DISCONTINUED | OUTPATIENT
Start: 2022-12-23 | End: 2022-12-23 | Stop reason: HOSPADM

## 2022-12-22 RX ORDER — ACETAMINOPHEN 325 MG/1
650 TABLET ORAL ONCE
Status: COMPLETED | OUTPATIENT
Start: 2022-12-22 | End: 2022-12-22

## 2022-12-22 RX ORDER — SODIUM CHLORIDE, SODIUM LACTATE, POTASSIUM CHLORIDE, CALCIUM CHLORIDE 600; 310; 30; 20 MG/100ML; MG/100ML; MG/100ML; MG/100ML
125 INJECTION, SOLUTION INTRAVENOUS CONTINUOUS
Status: DISCONTINUED | OUTPATIENT
Start: 2022-12-22 | End: 2022-12-22 | Stop reason: HOSPADM

## 2022-12-22 RX ORDER — ENOXAPARIN SODIUM 100 MG/ML
40 INJECTION SUBCUTANEOUS DAILY
Status: DISCONTINUED | OUTPATIENT
Start: 2022-12-23 | End: 2022-12-23 | Stop reason: HOSPADM

## 2022-12-22 RX ORDER — NALOXONE HYDROCHLORIDE 0.4 MG/ML
0.2 INJECTION, SOLUTION INTRAMUSCULAR; INTRAVENOUS; SUBCUTANEOUS
Status: DISCONTINUED | OUTPATIENT
Start: 2022-12-22 | End: 2022-12-22 | Stop reason: HOSPADM

## 2022-12-22 RX ORDER — POLYETHYLENE GLYCOL 3350 17 G/17G
17 POWDER, FOR SOLUTION ORAL DAILY
Status: DISCONTINUED | OUTPATIENT
Start: 2022-12-22 | End: 2022-12-23 | Stop reason: HOSPADM

## 2022-12-22 RX ORDER — GABAPENTIN 300 MG/1
300 CAPSULE ORAL ONCE
Status: COMPLETED | OUTPATIENT
Start: 2022-12-22 | End: 2022-12-22

## 2022-12-22 RX ORDER — SODIUM CHLORIDE 0.9 % (FLUSH) 0.9 %
5-40 SYRINGE (ML) INJECTION AS NEEDED
Status: DISCONTINUED | OUTPATIENT
Start: 2022-12-22 | End: 2022-12-23 | Stop reason: HOSPADM

## 2022-12-22 RX ORDER — LIDOCAINE HYDROCHLORIDE 10 MG/ML
0.1 INJECTION, SOLUTION EPIDURAL; INFILTRATION; INTRACAUDAL; PERINEURAL AS NEEDED
Status: DISCONTINUED | OUTPATIENT
Start: 2022-12-22 | End: 2022-12-22 | Stop reason: HOSPADM

## 2022-12-22 RX ORDER — OXYCODONE HYDROCHLORIDE 5 MG/1
5 TABLET ORAL
Status: DISCONTINUED | OUTPATIENT
Start: 2022-12-22 | End: 2022-12-23 | Stop reason: HOSPADM

## 2022-12-22 RX ORDER — FLUMAZENIL 0.1 MG/ML
0.2 INJECTION INTRAVENOUS
Status: DISCONTINUED | OUTPATIENT
Start: 2022-12-22 | End: 2022-12-22 | Stop reason: HOSPADM

## 2022-12-22 RX ORDER — AMOXICILLIN 250 MG
1 CAPSULE ORAL 2 TIMES DAILY
Status: DISCONTINUED | OUTPATIENT
Start: 2022-12-22 | End: 2022-12-23 | Stop reason: HOSPADM

## 2022-12-22 RX ORDER — ONDANSETRON 2 MG/ML
INJECTION INTRAMUSCULAR; INTRAVENOUS AS NEEDED
Status: DISCONTINUED | OUTPATIENT
Start: 2022-12-22 | End: 2022-12-22 | Stop reason: HOSPADM

## 2022-12-22 RX ORDER — EPHEDRINE SULFATE/0.9% NACL/PF 50 MG/5 ML
SYRINGE (ML) INTRAVENOUS AS NEEDED
Status: DISCONTINUED | OUTPATIENT
Start: 2022-12-22 | End: 2022-12-22 | Stop reason: HOSPADM

## 2022-12-22 RX ORDER — FACIAL-BODY WIPES
10 EACH TOPICAL DAILY PRN
Status: DISCONTINUED | OUTPATIENT
Start: 2022-12-24 | End: 2022-12-23 | Stop reason: HOSPADM

## 2022-12-22 RX ORDER — NALOXONE HYDROCHLORIDE 0.4 MG/ML
0.4 INJECTION, SOLUTION INTRAMUSCULAR; INTRAVENOUS; SUBCUTANEOUS AS NEEDED
Status: DISCONTINUED | OUTPATIENT
Start: 2022-12-22 | End: 2022-12-23 | Stop reason: HOSPADM

## 2022-12-22 RX ORDER — FENTANYL CITRATE 50 UG/ML
INJECTION, SOLUTION INTRAMUSCULAR; INTRAVENOUS AS NEEDED
Status: DISCONTINUED | OUTPATIENT
Start: 2022-12-22 | End: 2022-12-22 | Stop reason: HOSPADM

## 2022-12-22 RX ORDER — PROPOFOL 10 MG/ML
INJECTION, EMULSION INTRAVENOUS AS NEEDED
Status: DISCONTINUED | OUTPATIENT
Start: 2022-12-22 | End: 2022-12-22 | Stop reason: HOSPADM

## 2022-12-22 RX ORDER — MORPHINE SULFATE 2 MG/ML
1 INJECTION, SOLUTION INTRAMUSCULAR; INTRAVENOUS
Status: ACTIVE | OUTPATIENT
Start: 2022-12-22 | End: 2022-12-23

## 2022-12-22 RX ORDER — ROPIVACAINE HYDROCHLORIDE 5 MG/ML
INJECTION, SOLUTION EPIDURAL; INFILTRATION; PERINEURAL AS NEEDED
Status: DISCONTINUED | OUTPATIENT
Start: 2022-12-22 | End: 2022-12-22 | Stop reason: HOSPADM

## 2022-12-22 RX ORDER — SODIUM CHLORIDE 0.9 % (FLUSH) 0.9 %
5-40 SYRINGE (ML) INJECTION EVERY 8 HOURS
Status: DISCONTINUED | OUTPATIENT
Start: 2022-12-22 | End: 2022-12-23 | Stop reason: HOSPADM

## 2022-12-22 RX ORDER — PROPOFOL 10 MG/ML
INJECTION, EMULSION INTRAVENOUS
Status: DISCONTINUED | OUTPATIENT
Start: 2022-12-22 | End: 2022-12-22 | Stop reason: HOSPADM

## 2022-12-22 RX ORDER — FAMOTIDINE 10 MG/ML
INJECTION INTRAVENOUS AS NEEDED
Status: DISCONTINUED | OUTPATIENT
Start: 2022-12-22 | End: 2022-12-22 | Stop reason: HOSPADM

## 2022-12-22 RX ADMIN — CEFAZOLIN 2 G: 1 INJECTION, POWDER, FOR SOLUTION INTRAMUSCULAR; INTRAVENOUS at 17:57

## 2022-12-22 RX ADMIN — SODIUM CHLORIDE 125 ML/HR: 9 INJECTION, SOLUTION INTRAVENOUS at 11:52

## 2022-12-22 RX ADMIN — ROPIVACAINE HYDROCHLORIDE 30 ML: 5 INJECTION, SOLUTION EPIDURAL; INFILTRATION; PERINEURAL at 09:14

## 2022-12-22 RX ADMIN — PROPOFOL 50 MCG/KG/MIN: 10 INJECTION, EMULSION INTRAVENOUS at 09:49

## 2022-12-22 RX ADMIN — DEXAMETHASONE SODIUM PHOSPHATE 8 MG: 4 INJECTION, SOLUTION INTRAMUSCULAR; INTRAVENOUS at 09:50

## 2022-12-22 RX ADMIN — FENTANYL CITRATE 100 MCG: 50 INJECTION, SOLUTION INTRAMUSCULAR; INTRAVENOUS at 09:09

## 2022-12-22 RX ADMIN — SODIUM CHLORIDE, PRESERVATIVE FREE 10 ML: 5 INJECTION INTRAVENOUS at 21:04

## 2022-12-22 RX ADMIN — SODIUM CHLORIDE, PRESERVATIVE FREE 10 ML: 5 INJECTION INTRAVENOUS at 14:00

## 2022-12-22 RX ADMIN — LIDOCAINE HYDROCHLORIDE 20 MG: 20 INJECTION, SOLUTION EPIDURAL; INFILTRATION; INTRACAUDAL; PERINEURAL at 09:38

## 2022-12-22 RX ADMIN — CLONIDINE HYDROCHLORIDE 0.1 MG: 0.1 TABLET ORAL at 21:03

## 2022-12-22 RX ADMIN — ACETAMINOPHEN 650 MG: 325 TABLET, FILM COATED ORAL at 08:41

## 2022-12-22 RX ADMIN — HYDRALAZINE HYDROCHLORIDE 10 MG: 20 INJECTION INTRAMUSCULAR; INTRAVENOUS at 03:28

## 2022-12-22 RX ADMIN — BUPIVACAINE HYDROCHLORIDE 10 ML/HR: 7.5 INJECTION, SOLUTION EPIDURAL; RETROBULBAR at 11:08

## 2022-12-22 RX ADMIN — ROPIVACAINE HYDROCHLORIDE 10 ML: 5 INJECTION, SOLUTION EPIDURAL; INFILTRATION; PERINEURAL at 09:21

## 2022-12-22 RX ADMIN — SENNOSIDES AND DOCUSATE SODIUM 1 TABLET: 50; 8.6 TABLET ORAL at 17:58

## 2022-12-22 RX ADMIN — SODIUM CHLORIDE, PRESERVATIVE FREE 10 ML: 5 INJECTION INTRAVENOUS at 05:11

## 2022-12-22 RX ADMIN — Medication 500 MCG: at 09:57

## 2022-12-22 RX ADMIN — VALSARTAN 80 MG: 80 TABLET ORAL at 11:58

## 2022-12-22 RX ADMIN — PROPOFOL 120 MG: 10 INJECTION, EMULSION INTRAVENOUS at 09:38

## 2022-12-22 RX ADMIN — CEFAZOLIN SODIUM 2 G: 1 POWDER, FOR SOLUTION INTRAMUSCULAR; INTRAVENOUS at 09:48

## 2022-12-22 RX ADMIN — VALSARTAN 40 MG: 40 TABLET, FILM COATED ORAL at 17:58

## 2022-12-22 RX ADMIN — CLONIDINE HYDROCHLORIDE 0.1 MG: 0.1 TABLET ORAL at 11:09

## 2022-12-22 RX ADMIN — GABAPENTIN 300 MG: 300 CAPSULE ORAL at 08:41

## 2022-12-22 RX ADMIN — FAMOTIDINE 20 MG: 10 INJECTION INTRAVENOUS at 09:50

## 2022-12-22 RX ADMIN — POLYETHYLENE GLYCOL 3350 17 G: 17 POWDER, FOR SOLUTION ORAL at 12:15

## 2022-12-22 RX ADMIN — ONDANSETRON HYDROCHLORIDE 4 MG: 2 SOLUTION INTRAMUSCULAR; INTRAVENOUS at 10:25

## 2022-12-22 RX ADMIN — SODIUM CHLORIDE, POTASSIUM CHLORIDE, SODIUM LACTATE AND CALCIUM CHLORIDE 125 ML/HR: 600; 310; 30; 20 INJECTION, SOLUTION INTRAVENOUS at 08:43

## 2022-12-22 NOTE — PROGRESS NOTES
Ortho Note    Subjective:    Viola Dennis is a 81 y.o. female w/ closed RIGHT trimalleolar ankle fx    Major Events:. Pain controlled    Objective:    Vital signs in last 24 hours:    Temp:  [97.4 °F (36.3 °C)-99 °F (37.2 °C)]   Pulse (Heart Rate):  []   BP: (116-194)/(63-96)   Resp Rate:  [16-20]   O2 Sat (%):  [94 %-100 %]   Weight:  [52.2 kg (115 lb)-52.2 kg (115 lb 1.3 oz)]     Temp (24hrs), Av.9 °F (36.6 °C), Min:97.4 °F (36.3 °C), Max:98.4 °F (36.9 °C)      Labs:    Lab Results   Component Value Date/Time    WBC 7.0 2022 03:54 AM    HGB 11.7 2022 03:54 AM    HCT 34.9 (L) 2022 03:54 AM    PLATELET 387  03:54 AM       Lab Results   Component Value Date/Time    Sodium 139 2022 03:54 AM    Potassium 4.5 2022 03:54 AM    Chloride 105 2022 03:54 AM    CO2 28 2022 03:54 AM    BUN 22 (H) 2022 03:54 AM       Physical Exam:    General: alert, cooperative, in NAD  Nonlabored resp    RIGHT Lower extremity    Dressing: splint c/d/i      Motor: + toe df/pf    Sensory: diminished sensation (nerve block just administered for surgery)    Vascular: Brisk capillary refill in toes    Assessment/Plan:    82 yo F w/ RIGHT closed trimalleolar ankle fx    Npo  Bedrest  Plan ORIF RIGHT ankle fx today    Montana Moya MD

## 2022-12-22 NOTE — PROGRESS NOTES
Problem: Falls - Risk of  Goal: *Absence of Falls  Description: Document Yakov Sanford Fall Risk and appropriate interventions in the flowsheet. Outcome: Progressing Towards Goal  Note: Fall Risk Interventions:  Mobility Interventions: Bed/chair exit alarm, Patient to call before getting OOB         Medication Interventions: Bed/chair exit alarm, Patient to call before getting OOB, Teach patient to arise slowly    Elimination Interventions: Bed/chair exit alarm, Call light in reach, Stay With Me (per policy)    History of Falls Interventions: Bed/chair exit alarm         Problem: Patient Education: Go to Patient Education Activity  Goal: Patient/Family Education  Outcome: Progressing Towards Goal     Problem: Aspiration - Risk of  Goal: *Absence of aspiration  Outcome: Progressing Towards Goal     Problem: Pressure Injury - Risk of  Goal: *Prevention of pressure injury  Description: Document Ady Scale and appropriate interventions in the flowsheet. Outcome: Progressing Towards Goal  Note: Pressure Injury Interventions:       Moisture Interventions: Absorbent underpads, Limit adult briefs    Activity Interventions: Increase time out of bed, PT/OT evaluation    Mobility Interventions: PT/OT evaluation, Turn and reposition approx.  every two hours(pillow and wedges)    Nutrition Interventions: Document food/fluid/supplement intake                     Problem: Patient Education: Go to Patient Education Activity  Goal: Patient/Family Education  Outcome: Progressing Towards Goal     Problem: Lower Extremity Fracture:Day of Admission  Goal: Medications  Outcome: Progressing Towards Goal     Problem: Lower Extremity Fracture:Day of Admission  Goal: Nutrition/Diet  Outcome: Progressing Towards Goal     Problem: Lower Extremity Fracture:Day of Surgery (Intiate SCIP Measures for Post-op Care)  Goal: Activity/Safety  Outcome: Progressing Towards Goal

## 2022-12-22 NOTE — ANESTHESIA POSTPROCEDURE EVALUATION
Procedure(s):  OPEN REDUCTION INTERNAL FIXATION RIGHT TRIMALLEOLAR ANKLE FRACTURE. general, regional    Anesthesia Post Evaluation      Multimodal analgesia: multimodal analgesia not used between 6 hours prior to anesthesia start to PACU discharge  Patient location during evaluation: PACU  Patient participation: complete - patient participated  Level of consciousness: awake and alert  Pain score: 0  Pain management: adequate  Airway patency: patent  Anesthetic complications: no  Cardiovascular status: hemodynamically stable, acceptable, hypertensive and blood pressure returned to baseline  Respiratory status: acceptable  Hydration status: acceptable  Comments: Patient seen and evaluated; no concerns. Post anesthesia nausea and vomiting:  none      INITIAL Post-op Vital signs:   Vitals Value Taken Time   /71 12/22/22 1110   Temp 36.4 °C (97.6 °F) 12/22/22 1058   Pulse 72 12/22/22 1113   Resp 18 12/22/22 1113   SpO2 100 % 12/22/22 1113   Vitals shown include unvalidated device data.

## 2022-12-22 NOTE — PROGRESS NOTES
12/22/2022   Care Management Progress Note      ICD-10-CM ICD-9-CM    1. Ambulatory dysfunction  R26.2 719.7       2. Closed fracture of right ankle with routine healing, subsequent encounter  S82.891D V54.19           RUR:  14%  Risk Level: [x]Low []Moderate []High  Value-based purchasing: [] Yes [x] No  Bundle patient: [] Yes [x] No   Specify:     Transition of care plan:  POD 0 ORIF right trimalleolar ankle fracture  SNF placement on 12/23 to [facility]   Outpatient follow-up. Pt's family to transport      12/22/2022 2:19 PM Confirmed with [facility] in admissions at [facility] they can accept pt for admission on 12/23. [facility] requested a chest xray. MD aware and ordered. CM met with pt and pt's , relayed [facility] can accept and [facility] is pending. Pt and pt's  were agreeable to planning for pt to admit to [facility] tomorrow and cancelling referral with [facility]. Pt's  confirmed he can transport pt to [facility] on 12/23. CM relayed discharge will be before 12PM.  CM will follow. 12/22/2022 12:09 PM EMR reviewed, message received from [facility] requesting chest xray for pt. Awaiting confirmation from [facility] they can accept pt. [facility] will follow for pt's PT and OT notes following surgery. CM will follow.  CRISTINA Manzano

## 2022-12-22 NOTE — OP NOTES
[facility]yaan [facility] 80  OPERATIVE REPORT    Name:  Dequan Fontenot  MR#:  [id number]  :  1940  ACCOUNT #:  [de-identified]  DATE OF SERVICE:  2022    PREOPERATIVE DIAGNOSIS:  Right closed trimalleolar ankle fracture. POSTOPERATIVE DIAGNOSIS:  Right closed trimalleolar ankle fracture. PROCEDURES PERFORMED:  1. Open reduction internal fixation, right trimalleolar ankle fracture. 2.  Independent interpretation of intraoperative fluoroscopy. SURGEON:  Seymour Kruse. Constanza Malagon MD    ASSISTANT:  Shaniqua Archuleta PA-C. During the procedure, Shaniqua Archuleta PA-C performed the duties of positioning, retraction, assistance with limb and implant management as well as closure, dressing, and splint application. ANESTHESIA:  Regional and general.    COMPLICATIONS:  None    SPECIMENS REMOVED:  None. IMPLANTS:  Arthrex medial hook plate and screws, Georgi lateral plate and screws. ESTIMATED BLOOD LOSS:  Less than 5 mL. DRAINS:  None. FINDINGS:  Right closed trimalleolar ankle fracture. TOURNIQUET TIME:  HemaClear calf tourniquet for 35 minutes. INDICATION FOR PROCEDURE:  This is an 60-year-old female who suffered a right closed trimalleolar ankle fracture. She was reduced and splinted. I offered both conservative and surgical management options to her. I recommended surgical fixation to promote healing, restore anatomy and function and allow for early weightbearing and motion and also to potentially prevent nonunion, malunion and post-traumatic osteoarthritis of the ankle because this was an unstable injury. I discussed the risks of surgery which include but are not limited to complications of anesthesia including death, pain, bleeding, infection, damage to surrounding structures, nonunion, malunion, DVT, PE, wound healing problems, post-traumatic osteoarthritis, ankle stiffness and pain, and the need for further surgery.   The patient verbalized understanding and elected to proceed with surgical intervention. DESCRIPTION OF PROCEDURE:  The correct patient, extremity, and operation were all identified in the preop holding area and I marked her right ankle. The Anesthesia team provided a regional block and a pain catheter and she was taken back to operating room, placed under general anesthesia and placed in the floppy lateral decubitus position and all bony prominences were padded well. The right lower extremity was then prepped and draped in the usual sterile fashion. A preop time-out was called. Again, the correct patient, extremity, and operation were identified, all parties were in agreement and we proceed with the operation. The patient received IV antibiotics within 30 minutes of her incision. The right foot and ankle were then exsanguinated with a HemaClear tourniquet and this was used as a calf tourniquet. A longitudinal incision was made centered over the medial malleolus, and sharp dissection was carried down to the level of skin and care was taken to protect and retract the saphenous vein and nerve. The oblique fracture of the medial malleolus was identified, curetted of intervening hematoma and periosteum and lavaged. The fracture was then reduced in anatomic position and the hooks for the medial hook plate from the Arthrex set were placed around the distal tip of the medial malleolus and impacted into position. Fluoroscopic images showed anatomic reduction of the fracture and the plate was affixed to the bone with the first bicortical screw in the oblong hole in the plate. When the screw was tightened, this created compression at the fracture site. The plate was further affixed to the bone with two compression locking screws. Fluoroscopic images showed anatomic reduction of the fracture and satisfactory placement of the hardware.     A longitudinal incision was made centered over the distal fibula and sharp dissection was carried down to the level of the skin. The superficial peroneal nerve was identified, mobilized and retracted. The lateral malleolus fracture was then identified, curetted of intervening hematoma and periosteum and the distal fibula locking plate from the Georgi set was affixed to the bone with four locking screws in the distal fragment. The fracture was then reduced and a lag screw was placed through the plate. The plate was further affixed to the bone with three bicortical nonlocking screws in the proximal fragment. External rotation stress view and Cotton testing showed no abnormal medial clear space or syndesmosis widening. Final fluoroscopic images of the right ankle were taken, reviewed and independently interpreted by me intraoperatively to show anatomic reduction of the trimalleolar ankle fracture. Lateral imaging showed that the posterior malleolus fracture was well reduced. The ankle joint was congruent and with dynamic fluoroscopic imaging with dorsiflexion and plantarflexion of the ankle, there was no subluxation of the talus on the tibia and posterior malleolus fracture remained stable. The hardware was all in good position and did not violate the joint. The wounds were then copiously irrigated with normal saline and closed in layers. A sterile dressing was applied. The tourniquet was dropped and a well-padded, well-molded short leg splint was applied to the right lower extremity and the patient was then awakened from anesthesia and taken to recovery room in hemodynamically stable condition. All lap and sharp counts were correct at the end of case. POSTOPERATIVE CARE:  The patient will be admitted back to the hospitalist service. She will be nonweightbearing to the right lower extremity. She will start Lovenox 40 mg subcu daily for DVT prophylaxis on the morning of postop day #1 and she can transition to aspirin 81 mg p.o. b.i.d. for a total of 6 weeks of anticoagulation, DVT prophylaxis.   She will follow up with Rosy Huynh in the [place] [id number] in 2 weeks as scheduled for postop followup.       Suyapa Bennett MD      [id number]  D:  12/22/2022 10:22  T:  12/22/2022 15:12  JOB #:  [id number]

## 2022-12-22 NOTE — BRIEF OP NOTE
Brief Postoperative Note    Patient: Trupti Beyer  YOB: 1940  MRN: [id number]    Date of Procedure: 12/22/2022     Pre-Op Diagnosis:     RIGHT closed Trimalleolar ankle fracture    Post-Op Diagnosis: Same as preoperative diagnosis. Procedure(s):  OPEN REDUCTION INTERNAL FIXATION RIGHT TRIMALLEOLAR ANKLE FRACTURE   INDEPENDENT INTERPRETATION OF INTRA-OPERATIVE FLUOROSCOPY    Surgeon(s): Sherita Hunter MD    Surgical Assistant: During the procedure Reece Aguilar PA-C performed the duties of positioning, retraction, assistance with limb and implant management as well as closure and dressing and splint placement      Anesthesia: regional w/ general     Estimated Blood Loss (mL): less than 5cc     Complications: None    Specimens: * No specimens in log *     Implants: * No implants in log *    Arthrex medial plate/screws  Georgi lateral plate/screws    Drains:   External Urinary Catheter 12/21/22 (Active)   Site Assessment Clean, dry, & intact 12/21/22 0258   Repositioned Yes 12/21/22 0258   Perineal Care Yes 12/21/22 0258   Wick Changed Yes 12/21/22 0258   Suction Canister/Tubing Changed Yes 12/21/22 0258       Findings:   RIGHT closed Trimalleolar ankle fracture      TT: hemaclear calf x 35 min    Electronically Signed by Arnoldo Albrecht MD on 12/22/2022 at 9:25 AM

## 2022-12-22 NOTE — ANESTHESIA PREPROCEDURE EVALUATION
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  TIA     Cardiovascular    Hypertension: well controlled              Exercise tolerance: >4 METS     GI/Hepatic/Renal  Within defined limits              Endo/Other        Cancer     Other Findings   Comments: lymphoma           Physical Exam    Airway  Mallampati: III  TM Distance: 4 - 6 cm         Cardiovascular               Dental  No notable dental hx       Pulmonary  Breath sounds clear to auscultation               Abdominal         Other Findings            Anesthetic Plan    ASA: 3  Anesthesia type: general and regional - popliteal fossa block and saphenous block          Induction: Intravenous  Anesthetic plan and risks discussed with: Patient

## 2022-12-22 NOTE — PROGRESS NOTES
Ayaan Kingelsen [facility] 79  [address] [facility], [address] [place] [address]  [phone]      Hospitalist  Progress Note      NAME:         Jeronimo Contreras   :        1940  MRM:        [id number]    Date of service: 2022      Chief complaint: R ankle pain    Interval HPI: Patient says she slept well. No new symptoms. No chest pain, SOB or fever. Awaiting surgery this morning. Objective:    Vital Signs:    Visit Vitals  /86 (BP 1 Location: Right upper arm, BP Patient Position: At rest)   Pulse 79   Temp 97.8 °F (36.6 °C)   Resp 16   Ht 5' 4\" (1.626 m)   Wt 52.2 kg (115 lb)   SpO2 98%   Breastfeeding No   BMI 19.74 kg/m²        Intake/Output Summary (Last 24 hours) at 2022 0740  Last data filed at 2022 0401  Gross per 24 hour   Intake 700 ml   Output 700 ml   Net 0 ml          Physical Examination:    General:   Well looking patient in no acute distress  Eyes:   pink conjunctivae, PERRLA with no discharge. ENT:   no ottorrhea or rhinorrhea with dry mucous membranes  Pulm:  clear breath sounds without crackles or wheezes  Card:  no JVD or murmurs, has regular and normal S1, S2 without thrills, bruits   Abd:  Soft, non-tender, non-distended, normoactive bowel sounds   Musc:  No cyanosis, clubbing, atrophy or deformities. Splinted R ankle  Neuro: Awake and alert.  Generally a non focal exam.   Psych:  Has a fair insight to her illness     Current Facility-Administered Medications   Medication Dose Route Frequency    ceFAZolin (ANCEF) 2 g in sterile water (preservative free) 20 mL IV syringe  2 g IntraVENous ON CALL TO OR    hydrALAZINE (APRESOLINE) 20 mg/mL injection 10 mg  10 mg IntraVENous Q6H PRN    sodium chloride (NS) flush 5-40 mL  5-40 mL IntraVENous Q8H    sodium chloride (NS) flush 5-40 mL  5-40 mL IntraVENous PRN    acetaminophen (TYLENOL) tablet 650 mg  650 mg Oral Q6H PRN    Or    acetaminophen (TYLENOL) suppository 650 mg  650 mg Rectal Q6H PRN    polyethylene glycol (MIRALAX) packet 17 g  17 g Oral DAILY PRN    ondansetron (ZOFRAN ODT) tablet 4 mg  4 mg Oral Q8H PRN    Or    ondansetron (ZOFRAN) injection 4 mg  4 mg IntraVENous Q6H PRN    [Held by provider] enoxaparin (LOVENOX) injection 40 mg  40 mg SubCUTAneous DAILY    0.9% sodium chloride infusion  75 mL/hr IntraVENous CONTINUOUS    valsartan (DIOVAN) tablet 40 mg  40 mg Oral QPM    valsartan (DIOVAN) tablet 80 mg  80 mg Oral QAM    cloNIDine HCL (CATAPRES) tablet 0.1 mg  0.1 mg Oral BID    acetaminophen (TYLENOL) tablet 1,000 mg  1,000 mg Oral Q6H PRN    melatonin tablet 3 mg  3 mg Oral QHS PRN        Laboratory data and review:    Recent Labs     12/20/22  0354   WBC 7.0   HGB 11.7   HCT 34.9*          Recent Labs     12/20/22  0354      K 4.5      CO2 28   *   BUN 22*   CREA 0.72   CA 8.8   MG 2.1   ALB 2.6*   ALT 25   INR 1.0       No components found for: [place]    Diagnostics: Imaging studies have been reviewed    Assessment and Plan:    Trimalleolar fracture of right ankle (12/20/2022) POA: following a fall. Xrays right ankle confirm fracture. Allergic to codeine. Continue Acetaminophen. Seen by orthopedic surgery who have her scheduled for ORIF today. PT, OT thereafter. CM for discharge planning. Her COVID PCR test is neg. HTN (hypertension) (5/29/2012) /  Hyperlipidemia (5/29/2012) POA: BPs better controlled. Continue Clonidine, Valsartan. IV Hydralazine PRN. Control pain. On Lipitor    Psoriasis (4/5/2018)/ RA POA: Hold Methotrexate but can resume at discharge    I personally reviewed chart, notes, data and current medications in the medical record. I have personally examined and treated the patient at bedside during this period. To assist coordination of care and communication with nursing and staff, this note may be preliminary early in the day, but finalized by end of the day.                  Care Plan discussed with: Patient, Care Manager, and Nursing Staff    Discussed:  Care Plan and D/C Planning    Prophylaxis:  Lovenox    Anticipated Disposition:  Rehab           ___________________________________________________    Attending Physician:   Raquel Murry MD

## 2022-12-22 NOTE — ANESTHESIA PROCEDURE NOTES
Peripheral Block    Start time: 12/22/2022 9:09 AM  End time: 12/22/2022 9:21 AM  Performed by: Kathy Potts MD  Authorized by: Kathy Potts MD       Pre-procedure: Indications: at surgeon's request and post-op pain management    Preanesthetic Checklist: patient identified, risks and benefits discussed, site marked, timeout performed, anesthesia consent given, patient being monitored and fire risk safety assessment completed and verbalized    Timeout Time: 09:09 EST      Block Type:   Block Type:  Popliteal and saphenous  Laterality:  Right  Monitoring:  Continuous pulse ox, frequent vital sign checks, heart rate, responsive to questions and oxygen  Injection Technique:  Continuous  Procedures: ultrasound guided and nerve stimulator    Patient Position: supine  Prep: chlorhexidine    Location:  Upper thigh  Needle Type:  Tuohy  Needle Gauge:  18 G  Needle Localization:  Nerve stimulator and ultrasound guidance  Med Admin Time: 12/22/2022 9:21 AM    Assessment:  Number of attempts:  1  Injection Assessment:  Incremental injection every 5 mL, local visualized surrounding nerve on ultrasound, negative aspiration for blood, no paresthesia and no intravascular symptoms  Patient tolerance:  Patient tolerated the procedure well with no immediate complications  Saphenous block performed with ultrasound guidance; 4\" stimuplex 21g needle used, 10cc 0.5% ropivacaine injected slowly with intermittent aspiration. Pharmacy requesting refill    Refill Medication-lisinopril     LOV-6/13/16    Last refill-10/3015    Please advise

## 2022-12-22 NOTE — PROGRESS NOTES
Physical Therapy  Patient currently off the floor for ORIF of the Right ankle. She was evaluated yesterday, we will continue to follow after surgery. Thank you.   Clem Mead PT,DPT,NCS

## 2022-12-23 VITALS
HEART RATE: 68 BPM | SYSTOLIC BLOOD PRESSURE: 126 MMHG | BODY MASS INDEX: 19.65 KG/M2 | HEIGHT: 64 IN | RESPIRATION RATE: 18 BRPM | OXYGEN SATURATION: 100 % | TEMPERATURE: 97.7 F | WEIGHT: 115.08 LBS | DIASTOLIC BLOOD PRESSURE: 66 MMHG

## 2022-12-23 LAB
ANION GAP SERPL CALC-SCNC: 8 MMOL/L (ref 5–15)
BUN SERPL-MCNC: 20 MG/DL (ref 6–20)
BUN/CREAT SERPL: 19 (ref 12–20)
CALCIUM SERPL-MCNC: 9.1 MG/DL (ref 8.5–10.1)
CHLORIDE SERPL-SCNC: 101 MMOL/L (ref 97–108)
CO2 SERPL-SCNC: 24 MMOL/L (ref 21–32)
COVID-19 RAPID TEST, COVR: NOT DETECTED
CREAT SERPL-MCNC: 1.07 MG/DL (ref 0.55–1.02)
GLUCOSE SERPL-MCNC: 128 MG/DL (ref 65–100)
HGB BLD-MCNC: 11.9 G/DL (ref 11.5–16)
POTASSIUM SERPL-SCNC: 4.7 MMOL/L (ref 3.5–5.1)
SODIUM SERPL-SCNC: 133 MMOL/L (ref 136–145)
SOURCE, COVRS: NORMAL

## 2022-12-23 PROCEDURE — 85018 HEMOGLOBIN: CPT

## 2022-12-23 PROCEDURE — 87635 SARS-COV-2 COVID-19 AMP PRB: CPT

## 2022-12-23 PROCEDURE — 74011250637 HC RX REV CODE- 250/637: Performed by: PHYSICIAN ASSISTANT

## 2022-12-23 PROCEDURE — 74011250637 HC RX REV CODE- 250/637: Performed by: INTERNAL MEDICINE

## 2022-12-23 PROCEDURE — 74011250636 HC RX REV CODE- 250/636: Performed by: PHYSICIAN ASSISTANT

## 2022-12-23 PROCEDURE — 80048 BASIC METABOLIC PNL TOTAL CA: CPT

## 2022-12-23 PROCEDURE — 74011000250 HC RX REV CODE- 250: Performed by: PHYSICIAN ASSISTANT

## 2022-12-23 PROCEDURE — 36415 COLL VENOUS BLD VENIPUNCTURE: CPT

## 2022-12-23 PROCEDURE — 97530 THERAPEUTIC ACTIVITIES: CPT

## 2022-12-23 PROCEDURE — 74011000250 HC RX REV CODE- 250: Performed by: INTERNAL MEDICINE

## 2022-12-23 RX ORDER — GUAIFENESIN 100 MG/5ML
81 LIQUID (ML) ORAL 2 TIMES DAILY
Qty: 90 TABLET | Refills: 0 | Status: SHIPPED
Start: 2022-12-23 | End: 2023-02-06

## 2022-12-23 RX ORDER — AMOXICILLIN 250 MG
1 CAPSULE ORAL 2 TIMES DAILY
Qty: 60 TABLET | Refills: 0 | Status: SHIPPED
Start: 2022-12-23

## 2022-12-23 RX ORDER — OXYCODONE HYDROCHLORIDE 5 MG/1
5 TABLET ORAL
Qty: 20 TABLET | Refills: 0 | Status: SHIPPED | OUTPATIENT
Start: 2022-12-23 | End: 2022-12-30

## 2022-12-23 RX ORDER — POLYETHYLENE GLYCOL 3350 17 G/17G
17 POWDER, FOR SOLUTION ORAL
Qty: 30 EACH | Refills: 0 | Status: SHIPPED
Start: 2022-12-23

## 2022-12-23 RX ORDER — FACIAL-BODY WIPES
10 EACH TOPICAL
Qty: 30 EACH | Refills: 0 | Status: SHIPPED
Start: 2022-12-23

## 2022-12-23 RX ADMIN — SENNOSIDES AND DOCUSATE SODIUM 1 TABLET: 50; 8.6 TABLET ORAL at 10:12

## 2022-12-23 RX ADMIN — CEFAZOLIN 2 G: 1 INJECTION, POWDER, FOR SOLUTION INTRAMUSCULAR; INTRAVENOUS at 01:31

## 2022-12-23 RX ADMIN — SODIUM CHLORIDE, PRESERVATIVE FREE 10 ML: 5 INJECTION INTRAVENOUS at 05:58

## 2022-12-23 RX ADMIN — CLONIDINE HYDROCHLORIDE 0.1 MG: 0.1 TABLET ORAL at 10:11

## 2022-12-23 RX ADMIN — Medication 1 TABLET: at 10:12

## 2022-12-23 RX ADMIN — ENOXAPARIN SODIUM 40 MG: 100 INJECTION SUBCUTANEOUS at 10:12

## 2022-12-23 RX ADMIN — VALSARTAN 80 MG: 80 TABLET ORAL at 10:11

## 2022-12-23 NOTE — PROGRESS NOTES
Medicare pt has received, reviewed, and signed 2nd IM letter informing them of their right to appeal the discharge. Signed copy has been placed on pt bedside chart.   Sheba Bentley CMS

## 2022-12-23 NOTE — DISCHARGE SUMMARY
Ayaan Junior [facility] 79  [address] [facility], [address] [place] [address]  [phone] [address] Adult  Hospitalist Group     Discharge Summary       PATIENT ID: Giuseppe Oseguera  MRN: [id number]   YOB: 1940    DATE OF ADMISSION: 12/19/2022  2:01 PM    DATE OF DISCHARGE: 12/23/22   PRIMARY CARE PROVIDER: Alexis Santamaria MD     DISCHARGING PROVIDER: Prosper Arriaga MD      CONSULTATIONS: IP CONSULT TO ORTHOPEDIC SURGERY    PROCEDURES/SURGERIES: Procedure(s):  OPEN REDUCTION INTERNAL FIXATION RIGHT TRIMALLEOLAR ANKLE FRACTURE    ADMITTING [address] [place] [address] COURSE:       Trimalleolar fracture of right ankle (12/20/2022) POA: following a fall. Xrays right ankle confirm fracture. Seen by orthopedic surgery who did ORIF on 12/22. Cont PT/OT. ASA 81mg BID x 6 weeks for ppx, then cont daily dosing. HTN (hypertension) (5/29/2012) /  Hyperlipidemia (5/29/2012) POA: BPs better controlled. Continue Clonidine, Valsartan. Control pain. On Lipitor     Psoriasis (4/5/2018)/ RA POA: resume mtx      PENDING TEST RESULTS:   At the time of discharge the following test results are still pending: none    FOLLOW UP APPOINTMENTS:    Follow-up Information       Follow up With Specialties Details Why Contact Info    Alexis Santamaria MD [facility]     [place] [address]  [address] [place] [address]  [phone]      [facility], [place] Physician Assistant Follow up follow up as scheduled [address] [place] [id number] [phone]                 DIET: regular    ACTIVITY: as tolerated       DISCHARGE MEDICATIONS:  Current Discharge Medication List        START taking these medications    Details   bisacodyL (DULCOLAX) 10 mg supp Insert 10 mg into rectum daily as needed for Constipation (For unrelieved constipation. ).   Qty: 30 Each, Refills: 0  Start date: 12/23/2022      oxyCODONE IR (ROXICODONE) 5 mg immediate release tablet Take 1 Tablet by mouth every four (4) hours as needed for Pain for up to 7 days. Max Daily Amount: 30 mg.  Qty: 20 Tablet, Refills: 0  Start date: 12/23/2022, End date: 12/30/2022    Associated Diagnoses: Closed trimalleolar fracture of right ankle, initial encounter      senna-docusate (PERICOLACE) 8.6-50 mg per tablet Take 1 Tablet by mouth two (2) times a day. Qty: 60 Tablet, Refills: 0  Start date: 12/23/2022      polyethylene glycol (MIRALAX) 17 gram packet Take 1 Packet by mouth daily as needed for Constipation. Qty: 30 Each, Refills: 0  Start date: 12/23/2022           CONTINUE these medications which have CHANGED    Details   aspirin 81 mg chewable tablet Take 1 Tablet by mouth two (2) times a day for 45 days. Qty: 90 Tablet, Refills: 0  Start date: 12/23/2022, End date: 2/6/2023           CONTINUE these medications which have NOT CHANGED    Details   BIOTIN PO Take 1,000 mcg by mouth daily. docosahexaenoic acid/epa (FISH OIL PO) Take 1,200 mg by mouth daily. folic acid (FOLVITE) 1 mg tablet Take 1 mg by mouth daily. ferrous sulfate (IRON PO) Take 1 Tablet by mouth every Monday and Friday. methotrexate (RHEUMATREX) 2.5 mg tablet Take 7.5 mg by mouth every seven (7) days. !! valsartan (DIOVAN) 80 mg tablet Take 80 mg by mouth Every morning. acetaminophen (Tylenol Extra Strength) 500 mg tablet Take 500 mg by mouth as needed for Pain. cyanocobalamin, vitamin B-12, (VITAMIN B-12 PO) Take 1 Tablet by mouth every Monday and Friday. l.acidoph-B.lactis-B.longum (FLORAJEN3) 460 mg (7.5-6- 1.5 bill. cell) cap cap Take 1 Cap by mouth Daily (before breakfast). Qty: 7 Cap, Refills: 0      cholecalciferol (VITAMIN D3) (2,000 UNITS /50 MCG) cap capsule Take 2,000 Units by mouth daily. atorvastatin (LIPITOR) 10 mg tablet Take 1 Tab by mouth daily. Qty: 30 Tab, Refills: 0      cloNIDine (CATAPRES) 0.1 mg tablet Take 0.1 mg by mouth two (2) times a day.       !! valsartan (DIOVAN) 40 mg tablet Take 40 mg by mouth every evening. omega-3 fatty acids-vitamin e (FISH OIL) 1,000 mg Cap Take 1 Cap by mouth. !! - Potential duplicate medications found. Please discuss with provider. NOTIFY YOUR PHYSICIAN FOR ANY OF THE FOLLOWING:   Fever over 101 degrees for 24 hours. Chest pain, shortness of breath, fever, chills, nausea, vomiting, diarrhea, change in mentation, falling, weakness, bleeding. Severe pain or pain not relieved by medications. Or, any other signs or symptoms that you may have questions about. DISPOSITION:  x  Home With:   OT  PT  HH  RN       Long term SNF/Inpatient Rehab    Independent/assisted living    Hospice    Other:         PHYSICAL EXAMINATION AT DISCHARGE:  General:          Alert, cooperative, no distress, appears stated age. HEENT:           Atraumatic, anicteric sclerae, pink conjunctivae                          No oral ulcers, mucosa moist, throat clear, dentition fair  Neck:               Supple, symmetrical  Lungs:             Clear to auscultation bilaterally. No Wheezing or Rhonchi. No rales. Heart:              Regular  rhythm,  No  murmur   No edema  Abdomen:        Soft, non-tender. Not distended. Bowel sounds normal  Extremities:     No cyanosis. No clubbing,                            Skin turgor normal, Capillary refill normal  Skin:                Not pale. Not Jaundiced  No rashes   Psych:             Not anxious or agitated.   Neurologic:      Alert, moves all extremities, answers questions appropriately and responds to commands       CHRONIC MEDICAL DIAGNOSES:  Problem List as of 12/23/2022 Date Reviewed: 4/4/2018            Codes Class Noted - Resolved    * (Principal) Trimalleolar fracture of right ankle ICD-10-CM: P36.902M  ICD-9-CM: 824.6  12/20/2022 - Present        Cellulitis ICD-10-CM: L03.90  ICD-9-CM: 682.9  4/5/2018 - Present        Psoriasis ICD-10-CM: L40.9  ICD-9-CM: 696.1  4/5/2018 - Present        Leg swelling ICD-10-CM: M79.89  ICD-9-CM: 729.81  4/5/2018 - Present        PVD (peripheral vascular disease) ([facility] ICD-10-CM: I73.9  ICD-9-CM: 443.9  4/5/2018 - Present        Bilateral leg edema ICD-10-CM: R60.0  ICD-9-CM: 782.3  4/5/2018 - Present        Vasculitis ([facility] ICD-10-CM: I77.6  ICD-9-CM: 447.6  4/5/2018 - Present        Hyponatremia ICD-10-CM: E87.1  ICD-9-CM: 276.1  4/4/2018 - Present        Hypercalcemia ICD-10-CM: Q88.99  ICD-9-CM: 275.42  4/4/2018 - Present        CVA (cerebral infarction) ICD-10-CM: I63.9  ICD-9-CM: 434.91  5/29/2012 - Present        HTN (hypertension) (Chronic) ICD-10-CM: I10  ICD-9-CM: 401.9  5/29/2012 - Present    Overview Signed 9/17/2014  9:27 AM by Kika Shankar MD     A. Echo (5/30/12):  EF 65%, pseudo. Mildly dil LA. Mild MR/AR. PASP 40.              Hyperlipidemia (Chronic) ICD-10-CM: E78.5  ICD-9-CM: 272.4  5/29/2012 - Present        RESOLVED: Vasculitis ([facility] ICD-10-CM: I77.6  ICD-9-CM: 447.6  4/4/2018 - 4/5/2018        RESOLVED: CVA (cerebral vascular accident) [facility]) ICD-10-CM: I63.9  ICD-9-CM: 434.91  4/4/2018 - 8/3/2021           Greater than 30 minutes were spent with the patient on counseling and coordination of care    Signed:   Shahla Ibrahim MD  12/23/2022  11:11 AM

## 2022-12-23 NOTE — PROGRESS NOTES
Problem: Self Care Deficits Care Plan (Adult)  Goal: *Acute Goals and Plan of Care (Insert Text)  Description: FUNCTIONAL STATUS PRIOR TO ADMISSION: Patient was independent and active without use of DME however since injury, spouse assisting patient with stand pivots to [facility] and commode and assisting with self care tasks. HOME SUPPORT: The patient lived with spouse but did not require assist at baseline (assist for transfers and self care since injury). Occupational Therapy Goals  Initiated 12/21/2022  1. Patient will perform grooming with modified independence within 7 day(s). 2.  Patient will perform upper body bathing/dressing with modified independence within 7 day(s). 3.  Patient will perform lower body bathing/dressing with modified independence within 7 day(s). 4.  Patient will perform BSC progress to standard transfers (if able to stand and hop) with modified independence within 7 day(s). 5.  Patient will perform all aspects of toileting with modified independence within 7 day(s). 6.  Patient will participate in upper extremity therapeutic exercise/activities with modified independence for 10 minutes within 7 day(s). 7.  Patient will utilize energy conservation techniques during functional activities with verbal cues within 7 day(s). Outcome: Progressing Towards Goal     OCCUPATIONAL THERAPY TREATMENT  Patient: Prema Payne (22 y.o. female)  Date: 12/23/2022  Diagnosis: Fx ankle [S82.899A] Trimalleolar fracture of right ankle  Procedure(s) (LRB):  OPEN REDUCTION INTERNAL FIXATION RIGHT TRIMALLEOLAR ANKLE FRACTURE (Right) 1 Day Post-Op  Precautions: Fall, NWB  Chart, occupational therapy assessment, plan of care, and goals were reviewed. ASSESSMENT  Patient received semi supine in bed A&OX4 and agreeable for OT tx. Per chart review, pt is now s/p ORIF right trimalleolar ankle fracture (12/22/2022 by Dr. Yvnone Patino and is NWB RLE).  Patient continues with skilled OT services and is progressing towards goals. Patient currently SBA for bed mobility, CGA sit<->stand and stand pivot on LLE into recliner. Once seated in recliner patient demonstrating ability to doff/robin left sock and MI grooming simulated. Patient would benefit from continued skilled OT services while at [facility] in order to increase safety and independence with self care and functional transfers/mobility. D/C recommendation per MD.     Other factors to consider for discharge: time since onset, severity of deficits         PLAN :  Patient continues to benefit from skilled intervention to address the above impairments. Continue treatment per established plan of care to address goals. Recommend with staff: assist with self care tasks, assist with [facility] transfers    Recommend next OT session: continue to progress towards goals    Recommendation for discharge: (in order for the patient to meet his/her long term goals)  Per MD recommendation    This discharge recommendation:  Has been made in collaboration with the attending provider and/or case management    IF patient discharges home will need the following DME: TBD       SUBJECTIVE:   Patient stated I feel pretty good.     OBJECTIVE DATA SUMMARY:   Cognitive/Behavioral Status:  Neurologic State: Alert  Orientation Level: Oriented X4  Cognition: Follows commands     Functional Mobility and Transfers for ADLs:  Bed Mobility:  Supine to Sit: Stand-by assistance  Scooting: Stand-by assistance    Transfers:  Sit to Stand: Contact guard assistance     Bed to Chair: Contact guard assistance    Balance:  Sitting: Intact  Standing: Impaired; Without support  Standing - Static: Constant support; Fair  Standing - Dynamic : Constant support; Fair (during stand pivot)    ADL Intervention:     Grooming  Grooming Assistance: Modified independent (simulated)  Position Performed: Seated in chair  Washing Face: Modified independent    Lower Body Dressing Assistance  Socks: Stand-by assistance (left sock simulated)  Leg Crossed Method Used: Yes  Position Performed: Seated in chair       Pain:  No pain reported    Activity Tolerance:   Good    After treatment patient left in no apparent distress:   Sitting in chair and Call bell within reach    COMMUNICATION/COLLABORATION:   The patients plan of care was discussed with: Physical therapist and Registered nurse.      Juan Francisco Gomez  Time Calculation: 15 mins

## 2022-12-23 NOTE — PROGRESS NOTES
Problem: Falls - Risk of  Goal: *Absence of Falls  Description: Document Lizzy Nap Fall Risk and appropriate interventions in the flowsheet. Outcome: Progressing Towards Goal  Note: Fall Risk Interventions:  Mobility Interventions: Bed/chair exit alarm, Patient to call before getting OOB, PT Consult for mobility concerns         Medication Interventions: Bed/chair exit alarm, Evaluate medications/consider consulting pharmacy, Patient to call before getting OOB, Teach patient to arise slowly, Utilize gait belt for transfers/ambulation    Elimination Interventions: Bed/chair exit alarm, Call light in reach    History of Falls Interventions: Bed/chair exit alarm, Utilize gait belt for transfer/ambulation, Evaluate medications/consider consulting pharmacy         Problem: Patient Education: Go to Patient Education Activity  Goal: Patient/Family Education  Outcome: Progressing Towards Goal     Problem: Aspiration - Risk of  Goal: *Absence of aspiration  Outcome: Progressing Towards Goal     Problem: Patient Education: Go to Patient Education Activity  Goal: Patient/Family Education  Outcome: Progressing Towards Goal     Problem: Pressure Injury - Risk of  Goal: *Prevention of pressure injury  Description: Document Ady Scale and appropriate interventions in the flowsheet.   Outcome: Progressing Towards Goal  Note: Pressure Injury Interventions:       Moisture Interventions: Absorbent underpads, Minimize layers, Moisture barrier    Activity Interventions: Increase time out of bed, PT/OT evaluation    Mobility Interventions: Float heels, HOB 30 degrees or less, PT/OT evaluation    Nutrition Interventions: Document food/fluid/supplement intake    Friction and Shear Interventions: HOB 30 degrees or less, Minimize layers                Problem: Patient Education: Go to Patient Education Activity  Goal: Patient/Family Education  Outcome: Progressing Towards Goal     Problem: Lower Extremity Fracture:Day of Admission  Goal: Activity/Safety  Outcome: Progressing Towards Goal  Goal: Consults, if ordered  Outcome: Progressing Towards Goal  Goal: Diagnostic Test/Procedures  Outcome: Progressing Towards Goal  Goal: Nutrition/Diet  Outcome: Progressing Towards Goal  Goal: Medications  Outcome: Progressing Towards Goal  Goal: Respiratory  Outcome: Progressing Towards Goal  Goal: Treatments/Interventions/Procedures  Outcome: Progressing Towards Goal  Goal: Psychosocial  Outcome: Progressing Towards Goal  Goal: *Optimal pain control at patient's stated goal  Outcome: Progressing Towards Goal  Goal: *Hemodynamically stable  Outcome: Progressing Towards Goal  Goal: *Adequate oxygenation  Outcome: Progressing Towards Goal     Problem: Lower Extremity Fracture:Day of Surgery (Intiate SCIP Measures for Post-op Care)  Goal: Off Pathway (Use only if patient is Off Pathway)  Outcome: Progressing Towards Goal  Goal: Activity/Safety  Outcome: Progressing Towards Goal  Goal: Consults, if ordered  Outcome: Progressing Towards Goal  Goal: Diagnostic Test/Procedures  Outcome: Progressing Towards Goal  Goal: Nutrition/Diet  Outcome: Progressing Towards Goal  Goal: Medications  Outcome: Progressing Towards Goal  Goal: Respiratory  Outcome: Progressing Towards Goal  Goal: Treatments/Interventions/Procedures  Outcome: Progressing Towards Goal  Goal: Psychosocial  Outcome: Progressing Towards Goal  Goal: *Optimal pain control at patient's stated goal  Outcome: Progressing Towards Goal  Goal: *Hemodynamically stable  Outcome: Progressing Towards Goal  Goal: *Adequate oxygenation  Outcome: Progressing Towards Goal     Problem: Lower Extremity Fracture:Post-op Day 1  Goal: Off Pathway (Use only if patient is Off Pathway)  Outcome: Progressing Towards Goal  Goal: Activity/Safety  Outcome: Progressing Towards Goal  Goal: Consults, if ordered  Outcome: Progressing Towards Goal  Goal: Diagnostic Test/Procedures  Outcome: Progressing Towards Goal  Goal: Nutrition/Diet  Outcome: Progressing Towards Goal  Goal: Discharge Planning  Outcome: Progressing Towards Goal  Goal: Medications  Outcome: Progressing Towards Goal  Goal: Respiratory  Outcome: Progressing Towards Goal  Goal: Treatments/Interventions/Procedures  Outcome: Progressing Towards Goal  Goal: Psychosocial  Outcome: Progressing Towards Goal  Goal: *Optimal pain control at patient's stated goal  Outcome: Progressing Towards Goal  Goal: *Hemodynamically stable  Outcome: Progressing Towards Goal  Goal: *Adequate oxygenation  Outcome: Progressing Towards Goal  Goal: *PT/INR within defined limits  Outcome: Progressing Towards Goal  Goal: *Demonstrates progressive activity  Outcome: Progressing Towards Goal     Problem: Lower Extremity Fracture:Post-op Day 2  Goal: Off Pathway (Use only if patient is Off Pathway)  Outcome: Progressing Towards Goal  Goal: Activity/Safety  Outcome: Progressing Towards Goal  Goal: Diagnostic Test/Procedures  Outcome: Progressing Towards Goal  Goal: Nutrition/Diet  Outcome: Progressing Towards Goal  Goal: Discharge Planning  Outcome: Progressing Towards Goal  Goal: Medications  Outcome: Progressing Towards Goal  Goal: Respiratory  Outcome: Progressing Towards Goal  Goal: Treatments/Interventions/Procedures  Outcome: Progressing Towards Goal  Goal: Psychosocial  Outcome: Progressing Towards Goal  Goal: *Optimal pain control at patient's stated goal  Outcome: Progressing Towards Goal  Goal: *Hemodynamically stable  Outcome: Progressing Towards Goal  Goal: *Adequate oxygenation  Outcome: Progressing Towards Goal  Goal: *PT/INR within defined limits  Outcome: Progressing Towards Goal  Goal: *Demonstrates progressive activity  Outcome: Progressing Towards Goal  Goal: *Voiding  Outcome: Progressing Towards Goal  Goal: *Bowel movement  Outcome: Progressing Towards Goal  Goal: *Tolerating diet  Outcome: Progressing Towards Goal     Problem: Lower Extremity Fracture:Post-op Day 3  Goal: Off Pathway (Use only if patient is Off Pathway)  Outcome: Progressing Towards Goal  Goal: Activity/Safety  Outcome: Progressing Towards Goal  Goal: Diagnostic Test/Procedures  Outcome: Progressing Towards Goal  Goal: Nutrition/Diet  Outcome: Progressing Towards Goal  Goal: Discharge Planning  Outcome: Progressing Towards Goal  Goal: Medications  Outcome: Progressing Towards Goal  Goal: Respiratory  Outcome: Progressing Towards Goal  Goal: Treatments/Interventions/Procedures  Outcome: Progressing Towards Goal  Goal: Psychosocial  Outcome: Progressing Towards Goal  Goal: *Optimal pain control at patient's stated goal  Outcome: Progressing Towards Goal  Goal: *Hemodynamically stable  Outcome: Progressing Towards Goal  Goal: *Adequate oxygenation  Outcome: Progressing Towards Goal  Goal: *PT/INR within defined limits  Outcome: Progressing Towards Goal  Goal: *Demonstrates progressive activity  Outcome: Progressing Towards Goal  Goal: *Voiding  Outcome: Progressing Towards Goal  Goal: *Bowel movement  Outcome: Progressing Towards Goal  Goal: *Tolerating diet  Outcome: Progressing Towards Goal

## 2022-12-23 NOTE — PROGRESS NOTES
Pt received in chair spouse at bedside. Pt with good understanding of NWB status on RLE. Requires CGA for SPT using RW for steadying. Reviewed HEP for knee ROM and hip strengthening in seated and supine. Sara Edwards Coma

## 2022-12-23 NOTE — PROGRESS NOTES
Orthopaedic Progress Note  Post Op day: 1 Day Post-Op    2022 11:34 AM     Patient: Eyad Romano MRN: [id number]  SSN: [id number]    YOB: 1940  Age: 81 y.o. Sex: female      Admit date:  2022  Date of Surgery:  2022   Procedures:  Procedure(s):  OPEN REDUCTION INTERNAL FIXATION RIGHT TRIMALLEOLAR ANKLE FRACTURE  Admitting Physician:  Adalberto Higgins MD   Surgeon:  Haley Mariscal) and Role:     * Orlando Escobedo MD - Primary    Consulting Physician(s): Treatment Team: Attending Provider: Erum Luis MD; Surgeon: Orlando Escobedo MD; Consulting Provider: MAX Christensen; Care Manager: Bee Huitron Primary Nurse: Dustin Bear, SOURAV; Occupational Therapist: Alfreda Booth; Physical Therapy Assistant: Babs John; Staff Nurse: Leighann Garg RN    SUBJECTIVE:     Eyad Romano is a 81 y.o. female is 1 Day Post-Op s/p Procedure(s):  OPEN REDUCTION INTERNAL FIXATION RIGHT TRIMALLEOLAR ANKLE FRACTURE with an appropriate level of post-operative pain. Planning for DC to SNF. No complaints of nausea, vomiting, dizziness, lightheadedness, chest pain, or shortness of breath. OBJECTIVE:       Physical Exam:  General: Alert, cooperative, no distress. Respiratory: Respirations unlabored  Neurological:  Neurovascular exam within normal limits. Musculoskeletal: Left Calves soft, supple, non-tender upon palpation. Splint:Clean, dry and intact. No significant erythema or swelling. Good brisk cap refill. Able to wiggle toes.     Vital Signs:      Patient Vitals for the past 8 hrs:   BP Temp Pulse Resp SpO2   22 0848 127/65 97.8 °F (36.6 °C) 79 19 98 %   22 0359 122/69 97.5 °F (36.4 °C) 80 20 97 %                                          Temp (24hrs), Av.4 °F (36.3 °C), Min:97.2 °F (36.2 °C), Max:97.8 °F (36.6 °C)      Labs:        Recent Labs     22  0131   HGB 11.9     Lab Results   Component Value Date/Time Sodium 133 (L) 12/23/2022 01:31 AM    Potassium 4.7 12/23/2022 01:31 AM    Chloride 101 12/23/2022 01:31 AM    CO2 24 12/23/2022 01:31 AM    Glucose 128 (H) 12/23/2022 01:31 AM    BUN 20 12/23/2022 01:31 AM    Creatinine 1.07 (H) 12/23/2022 01:31 AM    Calcium 9.1 12/23/2022 01:31 AM       PT/OT:                Patient mobility  Bed Mobility Training  Supine to Sit: Stand-by assistance  Scooting: Stand-by assistance  Transfer Training  Sit to Stand: Contact guard assistance  Stand to Sit: Contact guard assistance  Bed to Chair: Contact guard assistance                   ASSESSMENT / PLAN:   Principal Problem:    Trimalleolar fracture of right ankle (12/20/2022)    Active Problems:    HTN (hypertension) (5/29/2012)      Overview: A.  Echo (5/30/12):  EF 65%, pseudo. Mildly dil LA. Mild MR/AR. PASP       40. Hyperlipidemia (5/29/2012)      Psoriasis (4/5/2018)     S/p ORIF right trimalleolar fracture - POD#1    Pain Control: Good control   DVT Prophylaxis: lovenox   Hemodynamics: stable   Activity: NWB RLE    Disposition: SNF.      Signed By:  Lee Ann Whitlock [address] [place] [address]

## 2022-12-23 NOTE — PROGRESS NOTES
12/23/2022 4:57 PM  Received call from [facility] in admissions at [facility] requesting orders for pt's on-q ball. SIDDHARTH requested and received orders from Ortho PA. CM faxed orders to [address] [place] [address] at [phone]. CM also spoke with Nadia Chen with [phone] and relayed orders are being faxed. Received fax confirmation. 12/23/2022 10:58 AM  Confirmed with Kimberly in admissions at [facility] they can accept pt today, will need rapid COVID prior to discharge. Pt's RN, Johana Armenta is aware of need for  COVID test.  Transition of Care Plan to SNF/Rehab    SNF/Rehab Transition:  Patient has been accepted to [facility] and meets criteria for admission. Patient will transported by pt's  and expected to leave at [facility].    Communication to Patient/Family:  Met with patient and pt's  and they are agreeable to the transition plan. Communication to SNF/Rehab:  Bedside RN, Filipe, has been notified to update the transition plan to the facility and call report [phone]. Discharge information has been updated on the AVS.     Discharge instructions to be fax'd to facility via [address] [place] [address]. Nursing Please include all hard scripts for controlled substances, med rec and dc summary, and AVS in packet.      Reviewed and confirmed with facility, [facility], can manage the patient care needs for the following:     Nonnie Kocher with (X) only those applicable:    Medication:  []  Medications will be available at the facility  []  IV Antibiotics   [x]  Controlled Substance - hard copy to be sent with patient   []  Weekly Labs   Documents:  [x] Hard RX  [x] MAR  [] Kardex  [x] AVS  []Transfer Summary  [x]Discharge   Equipment:  []  CPAP/BiPAP  []  Wound Vacuum  []  Low or Urinary Device  []  PICC/Central Line  []  Nebulizer  []  Ventilator   Treatment:  []Isolation (for MRSA, VRE, etc.)  []Surgical Drain Management  []Tracheostomy Care  []Dressing Changes  []Dialysis with transportation and chair time  []PEG Care  []Oxygen  []Daily Weights for Heart Failure   Dietary:  []Any diet limitations  []Tube Feedings   []Total Parenteral Management (TPN)   Eligible for Medicaid Long Term Services and Supports  Yes:  [] Eligible for medical assistance or will become eligible within 180 days and UAI completed. [] Provider/Patient and/or support system has requested screening. [] UAI copy provided to patient or responsible party. [] UAI unavailable at discharge will send once processed to SNF provider. [] UAI unavailable at discharged mailed to patient  No:   [x] Private pay and is not financially eligible for Medicaid within the next 180 days. [] Reside out-of-state. [] A residents of a state owned/operated facility that is licensed  by [facility] and [employer] or [facility]  [] Enrollment in [facility] hospice services  []  [address]  [] Patient /Family declines to have screening completed or provide financial information for screening     Financial Resources:  Medicaid    [] Initiated and application pending   [] Full coverage     Advanced Care Plan:  []Surrogate Decision Maker of Care  []POA  []Communicated Code Status Full    Other    Care Management Interventions  PCP Verified by CM:  Yes  MyChart Signup: No  Discharge Durable Medical Equipment: No  Physical Therapy Consult: Yes  Occupational Therapy Consult: Yes  Support Systems: Spouse/Significant Other  Confirm Follow Up Transport: Family  The Plan for Transition of Care is Related to the Following Treatment Goals : IPR vs SNF  The Patient and/or Patient Representative was Provided with a Choice of Provider and Agrees with the Discharge Plan?: Yes  Freedom of Choice List was Provided with Basic Dialogue that Supports the Patient's Individualized Plan of Care/Goals, Treatment Preferences and Shares the Quality Data Associated with the Providers?: Yes  Discharge Location  Patient Expects to be Discharged to[de-identified] Skilled nursing facility

## 2024-02-08 ENCOUNTER — HOSPITAL ENCOUNTER (EMERGENCY)
Facility: HOSPITAL | Age: 84
Discharge: HOME | End: 2024-02-08
Attending: STUDENT IN AN ORGANIZED HEALTH CARE EDUCATION/TRAINING PROGRAM
Payer: MEDICARE

## 2024-02-08 ENCOUNTER — APPOINTMENT (OUTPATIENT)
Facility: HOSPITAL | Age: 84
End: 2024-02-08
Payer: MEDICARE

## 2024-02-08 ENCOUNTER — APPOINTMENT (OUTPATIENT)
Dept: VASCULAR SURGERY | Facility: HOSPITAL | Age: 84
End: 2024-02-08
Attending: STUDENT IN AN ORGANIZED HEALTH CARE EDUCATION/TRAINING PROGRAM
Payer: MEDICARE

## 2024-02-08 VITALS
RESPIRATION RATE: 18 BRPM | DIASTOLIC BLOOD PRESSURE: 79 MMHG | HEART RATE: 83 BPM | BODY MASS INDEX: 19.97 KG/M2 | TEMPERATURE: 97.6 F | HEIGHT: 64 IN | OXYGEN SATURATION: 100 % | WEIGHT: 117 LBS | SYSTOLIC BLOOD PRESSURE: 207 MMHG

## 2024-02-08 DIAGNOSIS — L03.115 CELLULITIS OF RIGHT LOWER LEG: Primary | ICD-10-CM

## 2024-02-08 LAB
ALBUMIN SERPL-MCNC: 2.9 G/DL (ref 3.5–5)
ALBUMIN/GLOB SERPL: 0.8 (ref 1.1–2.2)
ALP SERPL-CCNC: 127 U/L (ref 45–117)
ALT SERPL-CCNC: 29 U/L (ref 12–78)
ANION GAP SERPL CALC-SCNC: 4 MMOL/L (ref 5–15)
AST SERPL-CCNC: 22 U/L (ref 15–37)
BASOPHILS # BLD: 0 K/UL (ref 0–0.1)
BASOPHILS NFR BLD: 0 % (ref 0–1)
BILIRUB SERPL-MCNC: 0.4 MG/DL (ref 0.2–1)
BUN SERPL-MCNC: 24 MG/DL (ref 6–20)
BUN/CREAT SERPL: 27 (ref 12–20)
CALCIUM SERPL-MCNC: 9.8 MG/DL (ref 8.5–10.1)
CHLORIDE SERPL-SCNC: 104 MMOL/L (ref 97–108)
CO2 SERPL-SCNC: 27 MMOL/L (ref 21–32)
CREAT SERPL-MCNC: 0.9 MG/DL (ref 0.55–1.02)
CRP SERPL-MCNC: 5.71 MG/DL (ref 0–0.3)
DIFFERENTIAL METHOD BLD: ABNORMAL
EOSINOPHIL # BLD: 0 K/UL (ref 0–0.4)
EOSINOPHIL NFR BLD: 0 % (ref 0–7)
ERYTHROCYTE [DISTWIDTH] IN BLOOD BY AUTOMATED COUNT: 14.8 % (ref 11.5–14.5)
ERYTHROCYTE [SEDIMENTATION RATE] IN BLOOD: 55 MM/HR (ref 0–30)
GLOBULIN SER CALC-MCNC: 3.7 G/DL (ref 2–4)
GLUCOSE SERPL-MCNC: 110 MG/DL (ref 65–100)
HCT VFR BLD AUTO: 38.4 % (ref 35–47)
HGB BLD-MCNC: 13 G/DL (ref 11.5–16)
IMM GRANULOCYTES # BLD AUTO: 0.1 K/UL (ref 0–0.04)
IMM GRANULOCYTES NFR BLD AUTO: 1 % (ref 0–0.5)
LACTATE SERPL-SCNC: 0.8 MMOL/L (ref 0.4–2)
LYMPHOCYTES # BLD: 1 K/UL (ref 0.8–3.5)
LYMPHOCYTES NFR BLD: 11 % (ref 12–49)
MCH RBC QN AUTO: 34 PG (ref 26–34)
MCHC RBC AUTO-ENTMCNC: 33.9 G/DL (ref 30–36.5)
MCV RBC AUTO: 100.5 FL (ref 80–99)
MONOCYTES # BLD: 0.9 K/UL (ref 0–1)
MONOCYTES NFR BLD: 10 % (ref 5–13)
NEUTS SEG # BLD: 6.8 K/UL (ref 1.8–8)
NEUTS SEG NFR BLD: 78 % (ref 32–75)
NRBC # BLD: 0 K/UL (ref 0–0.01)
NRBC BLD-RTO: 0 PER 100 WBC
PLATELET # BLD AUTO: 233 K/UL (ref 150–400)
PMV BLD AUTO: 8.3 FL (ref 8.9–12.9)
POTASSIUM SERPL-SCNC: 5.1 MMOL/L (ref 3.5–5.1)
PROT SERPL-MCNC: 6.6 G/DL (ref 6.4–8.2)
RBC # BLD AUTO: 3.82 M/UL (ref 3.8–5.2)
RBC MORPH BLD: ABNORMAL
RBC MORPH BLD: ABNORMAL
SODIUM SERPL-SCNC: 135 MMOL/L (ref 136–145)
WBC # BLD AUTO: 8.8 K/UL (ref 3.6–11)

## 2024-02-08 PROCEDURE — 99285 EMERGENCY DEPT VISIT HI MDM: CPT

## 2024-02-08 PROCEDURE — 96365 THER/PROPH/DIAG IV INF INIT: CPT

## 2024-02-08 PROCEDURE — 86140 C-REACTIVE PROTEIN: CPT

## 2024-02-08 PROCEDURE — 36415 COLL VENOUS BLD VENIPUNCTURE: CPT

## 2024-02-08 PROCEDURE — 85025 COMPLETE CBC W/AUTO DIFF WBC: CPT

## 2024-02-08 PROCEDURE — 2500000003 HC RX 250 WO HCPCS: Performed by: STUDENT IN AN ORGANIZED HEALTH CARE EDUCATION/TRAINING PROGRAM

## 2024-02-08 PROCEDURE — 93971 EXTREMITY STUDY: CPT

## 2024-02-08 PROCEDURE — 83605 ASSAY OF LACTIC ACID: CPT

## 2024-02-08 PROCEDURE — 2580000003 HC RX 258: Performed by: STUDENT IN AN ORGANIZED HEALTH CARE EDUCATION/TRAINING PROGRAM

## 2024-02-08 PROCEDURE — 85652 RBC SED RATE AUTOMATED: CPT

## 2024-02-08 PROCEDURE — 73562 X-RAY EXAM OF KNEE 3: CPT

## 2024-02-08 PROCEDURE — 93005 ELECTROCARDIOGRAM TRACING: CPT | Performed by: EMERGENCY MEDICINE

## 2024-02-08 PROCEDURE — 80053 COMPREHEN METABOLIC PANEL: CPT

## 2024-02-08 RX ORDER — DOXYCYCLINE HYCLATE 100 MG
100 TABLET ORAL 2 TIMES DAILY
Qty: 20 TABLET | Refills: 0 | Status: SHIPPED | OUTPATIENT
Start: 2024-02-08 | End: 2024-02-18

## 2024-02-08 RX ADMIN — DOXYCYCLINE 100 MG: 100 INJECTION, POWDER, LYOPHILIZED, FOR SOLUTION INTRAVENOUS at 17:45

## 2024-02-08 ASSESSMENT — PAIN SCALES - GENERAL: PAINLEVEL_OUTOF10: 0

## 2024-02-08 NOTE — DISCHARGE INSTRUCTIONS
Take the prescribed medication as directed.  Return the emergency department if the wound worsens, you develop fevers or any other changing or worsening symptoms.  Follow-up with your primary doctor within the next 3 days for reevaluation of the wound

## 2024-02-08 NOTE — ED PROVIDER NOTES
[facility] EMERGENCY DEPT  EMERGENCY DEPARTMENT ENCOUNTER      Pt Name: Mary Beth Kaplan  MRN: [id number]  Birthdate 1940  Date of evaluation: 2/8/2024  Provider: Corbin Alonzo MD    CHIEF COMPLAINT       Chief Complaint   Patient presents with    Wound Infection       HISTORY OF PRESENT ILLNESS    HPI    83-year-old female with history of chronic vasculitis, psoriasis, peripheral vascular disease, hypertension presenting for right lower leg wound.  Patient states that few weeks ago she had a trip and fall where she scraped her right knee.  Sustained an abrasion but no significant laceration.  The wound seem to be healing using over-the-counter ointment.  However over the past few days she has had increased swelling, pain and dark red discoloration to her skin.  She denies numbness, tingling or weakness.  Reports swelling across along her entire right calf.  Denies chest pain or shortness of breath.  Denies fevers or chills.  Used to be on methotrexate for her psoriasis but no longer takes any immunosuppressive medications.    Went to her primary doctor's office today who sent her to the emergency department for further care.    Nursing notes reviewed.    REVIEW OF SYSTEMS     Review of Systems  Unless otherwise stated, a complete review of systems was asked of the patient. Pertinent positives are noted in the HPI section.    PAST MEDICAL HISTORY     Past Medical History:   Diagnosis Date    Adverse effect of anesthesia     Anesthesia     required prolonged warming , moderate amount of anesthesia    H/O psoriasis     Hyperlipidemia     Hypertension     Stroke (HCC) 05/01/2012    TIA       SURGICAL HISTORY       Past Surgical History:   Procedure Laterality Date    CATARACT REMOVAL      HYSTERECTOMY (CERVIX STATUS UNKNOWN)      OTHER SURGICAL HISTORY      removed skin cancer from chest    SEPTOPLASTY      repaired       CURRENT MEDICATIONS       Previous Medications    ACETAMINOPHEN (TYLENOL) 500 MG TABLET

## 2024-02-08 NOTE — ED TRIAGE NOTES
Pt was seen in office today by PCP and sent in for further workup of R leg wound.  Pt states it has been bothering her for a few days, but today is the first days she was able to make an appointment

## 2024-02-09 LAB
ECHO BSA: 1.55 M2
EKG ATRIAL RATE: 79 BPM
EKG DIAGNOSIS: NORMAL
EKG P AXIS: 71 DEGREES
EKG P-R INTERVAL: 184 MS
EKG Q-T INTERVAL: 372 MS
EKG QRS DURATION: 84 MS
EKG QTC CALCULATION (BAZETT): 426 MS
EKG R AXIS: 8 DEGREES
EKG T AXIS: 85 DEGREES
EKG VENTRICULAR RATE: 79 BPM

## 2024-02-09 NOTE — ED NOTES
Pt DC from ER. BP elevated prior to DC. Provider aware. Pt is asymptomatic and states her BP typically rises when she's cold. Pt wants to be DC. EKG done. Provider cleared DC.     Follow up instructions, prescriptions and education discussed. All questions answered.     IV removed.     Pt educated on when to come back and to take BP meds when arriving at home. Pt and  have verbal understanding.

## 2024-05-31 ENCOUNTER — HOSPITAL ENCOUNTER (EMERGENCY)
Facility: HOSPITAL | Age: 84
Discharge: HOME | End: 2024-05-31
Attending: STUDENT IN AN ORGANIZED HEALTH CARE EDUCATION/TRAINING PROGRAM

## 2024-05-31 VITALS
SYSTOLIC BLOOD PRESSURE: 152 MMHG | OXYGEN SATURATION: 100 % | WEIGHT: 118 LBS | BODY MASS INDEX: 20.14 KG/M2 | RESPIRATION RATE: 18 BRPM | HEIGHT: 64 IN | DIASTOLIC BLOOD PRESSURE: 88 MMHG | HEART RATE: 83 BPM | TEMPERATURE: 97.9 F

## 2024-05-31 DIAGNOSIS — T14.8XXA WOUND INFECTION: ICD-10-CM

## 2024-05-31 DIAGNOSIS — Z48.02 VISIT FOR SUTURE REMOVAL: Primary | ICD-10-CM

## 2024-05-31 DIAGNOSIS — L08.9 WOUND INFECTION: ICD-10-CM

## 2024-05-31 DIAGNOSIS — Z48.02 ENCOUNTER FOR STAPLE REMOVAL: ICD-10-CM

## 2024-05-31 RX ORDER — DOXYCYCLINE HYCLATE 100 MG
100 TABLET ORAL 2 TIMES DAILY
Qty: 20 TABLET | Refills: 0 | Status: SHIPPED | OUTPATIENT
Start: 2024-05-31 | End: 2024-06-10

## 2024-05-31 ASSESSMENT — PAIN DESCRIPTION - ORIENTATION: ORIENTATION: LOWER;LEFT

## 2024-05-31 ASSESSMENT — PAIN DESCRIPTION - LOCATION: LOCATION: LEG

## 2024-05-31 ASSESSMENT — PAIN DESCRIPTION - DESCRIPTORS: DESCRIPTORS: SORE

## 2024-05-31 ASSESSMENT — PAIN SCALES - GENERAL: PAINLEVEL_OUTOF10: 2

## 2024-05-31 NOTE — DISCHARGE INSTRUCTIONS
Take the prescribed antibiotic doxycycline for wound infection.  Follow-up with your primary doctor within 3 days for wound reassessment.  Return to the ER develop fevers, chills, drainage or any other changing or worsening symptoms.

## 2024-05-31 NOTE — ED TRIAGE NOTES
Pt states while she was on vacation in [place] she fell and was seen at a hospital there. She is here today to have the sutures removedfrom her left eye, and staples removed from right upper arm, left forearm and left leg.

## 2024-12-02 ENCOUNTER — APPOINTMENT (OUTPATIENT)
Facility: HOSPITAL | Age: 84
End: 2024-12-02
Payer: MEDICARE

## 2024-12-02 ENCOUNTER — HOSPITAL ENCOUNTER (EMERGENCY)
Facility: HOSPITAL | Age: 84
Discharge: HOME | End: 2024-12-02
Attending: EMERGENCY MEDICINE
Payer: MEDICARE

## 2024-12-02 VITALS
SYSTOLIC BLOOD PRESSURE: 157 MMHG | BODY MASS INDEX: 19.97 KG/M2 | OXYGEN SATURATION: 99 % | DIASTOLIC BLOOD PRESSURE: 67 MMHG | WEIGHT: 117 LBS | RESPIRATION RATE: 16 BRPM | HEIGHT: 64 IN | TEMPERATURE: 98.1 F | HEART RATE: 72 BPM

## 2024-12-02 DIAGNOSIS — R42 DIZZINESS: ICD-10-CM

## 2024-12-02 DIAGNOSIS — S06.0X0A CONCUSSION WITHOUT LOSS OF CONSCIOUSNESS, INITIAL ENCOUNTER: ICD-10-CM
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
## 2024-12-02 PROCEDURE — 70450 CT HEAD/BRAIN W/O DYE: CPT

## 2024-12-02 PROCEDURE — 72125 CT NECK SPINE W/O DYE: CPT

## 2024-12-02 PROCEDURE — 99284 EMERGENCY DEPT VISIT MOD MDM: CPT

## 2024-12-02 PROCEDURE — 6370000000 HC RX 637 (ALT 250 FOR IP): Performed by: NURSE PRACTITIONER

## 2024-12-02 PROCEDURE — 94761 N-INVAS EAR/PLS OXIMETRY MLT: CPT

## 2024-12-02 RX ORDER — MECLIZINE HCL 12.5 MG 12.5 MG/1
12.5 TABLET ORAL 3 TIMES DAILY PRN
Qty: 15 TABLET | Refills: 0 | Status: SHIPPED | OUTPATIENT
Start: 2024-12-02 | End: 2024-12-12

## 2024-12-02 RX ORDER — MECLIZINE HCL 12.5 MG 12.5 MG/1
12.5 TABLET ORAL ONCE
Status: COMPLETED | OUTPATIENT
Start: 2024-12-02 | End: 2024-12-02

## 2024-12-02 RX ADMIN — MECLIZINE 12.5 MG: 12.5 TABLET ORAL at 14:44

## 2024-12-02 ASSESSMENT — ENCOUNTER SYMPTOMS
ABDOMINAL DISTENTION: 0
COUGH: 0
SINUS PAIN: 0
SINUS PRESSURE: 0
COLOR CHANGE: 0
SHORTNESS OF BREATH: 0
ABDOMINAL PAIN: 0
EYE PAIN: 0
EYE REDNESS: 0
VOMITING: 0
NAUSEA: 0

## 2024-12-02 ASSESSMENT — PAIN SCALES - GENERAL: PAINLEVEL_OUTOF10: 2

## 2024-12-02 ASSESSMENT — PAIN DESCRIPTION - LOCATION: LOCATION: HEAD

## 2024-12-02 ASSESSMENT — PAIN DESCRIPTION - DESCRIPTORS: DESCRIPTORS: ACHING;DULL

## 2024-12-02 ASSESSMENT — PAIN DESCRIPTION - ORIENTATION: ORIENTATION: ANTERIOR;DISTAL

## 2024-12-02 ASSESSMENT — PAIN - FUNCTIONAL ASSESSMENT: PAIN_FUNCTIONAL_ASSESSMENT: 0-10

## 2024-12-02 NOTE — ED TRIAGE NOTES
Pt sts she fell yesterday and hit her head and felt dizzy with the room spinning with a HA    Pt denies being an blood thinners

## 2024-12-02 NOTE — ED PROVIDER NOTES
[facility] EMERGENCY DEPT  EMERGENCY DEPARTMENT ENCOUNTER      Pt Name: Mary Beth Kaplan  MRN: [id number]  Birthdate 1940  Date of evaluation: 12/2/2024  Provider: RITA Banuelos NP      HISTORY OF PRESENT ILLNESS      Chief Complaint: Fall with head strike, dizziness, and skin tear to right upper extremity      Past Medical History:  No date: Adverse effect of anesthesia  No date: Anesthesia      Comment:  required prolonged warming , moderate amount of                anesthesia  No date: H/O psoriasis  No date: Hyperlipidemia  No date: Hypertension  05/01/2012: Stroke (HCC)      Comment:  TIA  Past Surgical History:  No date: CATARACT REMOVAL  No date: HYSTERECTOMY (CERVIX STATUS UNKNOWN)  No date: OTHER SURGICAL HISTORY      Comment:  removed skin cancer from chest  No date: SEPTOPLASTY      Comment:  repaired      The history is provided by the patient.           Nursing Notes were reviewed.    REVIEW OF SYSTEMS         Review of Systems   Constitutional:  Negative for appetite change, chills, diaphoresis and fatigue.   HENT:  Negative for congestion, sinus pressure and sinus pain.    Eyes:  Negative for pain, redness and visual disturbance.   Respiratory:  Negative for cough and shortness of breath.    Cardiovascular:  Negative for chest pain and palpitations.   Gastrointestinal:  Negative for abdominal distention, abdominal pain, nausea and vomiting.   Genitourinary:  Negative for difficulty urinating.   Musculoskeletal:  Negative for arthralgias, neck pain and neck stiffness.   Skin:  Positive for wound (right upper arm with skin tear). Negative for color change, pallor and rash.   Neurological:  Positive for dizziness. Negative for speech difficulty and headaches.   Hematological:  Bruises/bleeds easily.   Psychiatric/Behavioral:  The patient is not nervous/anxious.            PAST MEDICAL HISTORY     Past Medical History:   Diagnosis Date    Adverse effect of anesthesia     Anesthesia

## (undated) DEVICE — GLOVE ORTHO 8   MSG9480

## (undated) DEVICE — GLOVE SURG SZ 8 L12IN FNGR THK79MIL GRN LTX FREE

## (undated) DEVICE — SUTURE VCRL SZ 0 L36IN ABSRB UD L36MM CT-1 1/2 CIR J946H

## (undated) DEVICE — DEVICE CLOSURE SKIN SURG

## (undated) DEVICE — DRESSING,GAUZE,XEROFORM,CURAD,5"X9",ST: Brand: CURAD

## (undated) DEVICE — GLOVE SURG SZ 85 L12IN FNGR THK79MIL GRN LTX FREE

## (undated) DEVICE — BIT DRL DIA2.5MM FOR ANK FRAC MGMT SYS

## (undated) DEVICE — PADDING CST 6IN STERILE --

## (undated) DEVICE — SUTURE MCRYL SZ 3-0 L27IN ABSRB UD L19MM PS-2 3/8 CIR PRIM Y427H

## (undated) DEVICE — GLOVE ORTHO 7 1/2   MSG9475

## (undated) DEVICE — SPEEDGUIDE DRILL AO: Brand: VARIAX

## (undated) DEVICE — EXTREMITY-SFMCASU: Brand: MEDLINE INDUSTRIES, INC.

## (undated) DEVICE — GLOVE ORANGE PI 7 1/2   MSG9075

## (undated) DEVICE — PAD,ABDOMINAL,5"X9",ST,LF,25/BX: Brand: MEDLINE INDUSTRIES, INC.

## (undated) DEVICE — Z INACTIVE USE 2854097 SPONGE GZ W4XL4IN COT 12 PLY TYP VII WVN C FLD DSGN

## (undated) DEVICE — ZIP 8I SURGICAL SKIN CLOSURE DEVICE: Brand: ZIP 8I SURGICAL SKIN CLOSURE DEVICE

## (undated) DEVICE — BANDAGE COMPR W6INXL10YD ST M E WHITE/BEIGE

## (undated) DEVICE — GLOVE ORANGE PI 8   MSG9080

## (undated) DEVICE — DRAPE C-ARMOUR C-ARM KIT --

## (undated) DEVICE — PADDING CAST SPEC 6INX4YD COT --

## (undated) DEVICE — CANISTER, RIGID, 3000CC: Brand: MEDLINE INDUSTRIES, INC.

## (undated) DEVICE — SUTURE NONABSORBABLE MONOFILAMENT 3-0 PS-1 18 IN BLK ETHILON 1663H

## (undated) DEVICE — Device

## (undated) DEVICE — GOWN,SIRUS,FABRNF,XL,20/CS: Brand: MEDLINE